# Patient Record
Sex: MALE | Race: WHITE | NOT HISPANIC OR LATINO | Employment: FULL TIME | URBAN - METROPOLITAN AREA
[De-identification: names, ages, dates, MRNs, and addresses within clinical notes are randomized per-mention and may not be internally consistent; named-entity substitution may affect disease eponyms.]

---

## 2023-06-23 ENCOUNTER — OFFICE VISIT (OUTPATIENT)
Dept: PRIMARY CARE | Facility: CLINIC | Age: 63
End: 2023-06-23
Payer: COMMERCIAL

## 2023-06-23 ENCOUNTER — LAB (OUTPATIENT)
Dept: LAB | Facility: LAB | Age: 63
End: 2023-06-23
Payer: COMMERCIAL

## 2023-06-23 VITALS
HEIGHT: 70 IN | WEIGHT: 257.6 LBS | BODY MASS INDEX: 36.88 KG/M2 | DIASTOLIC BLOOD PRESSURE: 80 MMHG | SYSTOLIC BLOOD PRESSURE: 142 MMHG

## 2023-06-23 DIAGNOSIS — M54.2 NECK PAIN: ICD-10-CM

## 2023-06-23 DIAGNOSIS — Z12.5 ENCOUNTER FOR PROSTATE CANCER SCREENING: ICD-10-CM

## 2023-06-23 DIAGNOSIS — I10 PRIMARY HYPERTENSION: ICD-10-CM

## 2023-06-23 DIAGNOSIS — R19.8 CHANGE IN BOWEL MOVEMENT: ICD-10-CM

## 2023-06-23 DIAGNOSIS — E03.9 HYPOTHYROIDISM, UNSPECIFIED TYPE: ICD-10-CM

## 2023-06-23 DIAGNOSIS — R53.83 OTHER FATIGUE: ICD-10-CM

## 2023-06-23 DIAGNOSIS — E78.5 HYPERLIPIDEMIA, UNSPECIFIED HYPERLIPIDEMIA TYPE: ICD-10-CM

## 2023-06-23 DIAGNOSIS — E55.9 VITAMIN D DEFICIENCY: ICD-10-CM

## 2023-06-23 PROBLEM — R11.0 NAUSEA IN ADULT: Status: ACTIVE | Noted: 2023-06-23

## 2023-06-23 PROBLEM — M62.838 MUSCLE SPASM: Status: ACTIVE | Noted: 2023-06-23

## 2023-06-23 PROBLEM — L08.9 INFECTED FINGER: Status: ACTIVE | Noted: 2023-06-23

## 2023-06-23 PROBLEM — M54.9 BACKACHE: Status: ACTIVE | Noted: 2023-06-23

## 2023-06-23 PROBLEM — F41.9 ANXIETY: Status: ACTIVE | Noted: 2023-06-23

## 2023-06-23 PROBLEM — M25.559 HIP PAIN: Status: ACTIVE | Noted: 2023-06-23

## 2023-06-23 PROBLEM — M25.561 RIGHT KNEE PAIN: Status: ACTIVE | Noted: 2023-06-23

## 2023-06-23 PROBLEM — R07.9 CHEST PAIN: Status: ACTIVE | Noted: 2023-06-23

## 2023-06-23 PROBLEM — R73.03 PRE-DIABETES: Status: ACTIVE | Noted: 2023-06-23

## 2023-06-23 PROBLEM — M25.562 LEFT KNEE PAIN: Status: ACTIVE | Noted: 2023-06-23

## 2023-06-23 PROBLEM — M79.606 LEG PAIN: Status: ACTIVE | Noted: 2023-06-23

## 2023-06-23 PROBLEM — E78.00 LOW-DENSITY-LIPOID-TYPE (LDL) HYPERLIPOPROTEINEMIA: Status: ACTIVE | Noted: 2023-06-23

## 2023-06-23 PROBLEM — J01.90 ACUTE SINUSITIS: Status: ACTIVE | Noted: 2023-06-23

## 2023-06-23 PROBLEM — M25.562 KNEE PAIN, BILATERAL: Status: ACTIVE | Noted: 2023-06-23

## 2023-06-23 PROBLEM — M17.11 ARTHRITIS OF KNEE, RIGHT: Status: ACTIVE | Noted: 2023-06-23

## 2023-06-23 PROBLEM — R21 RASH: Status: ACTIVE | Noted: 2023-06-23

## 2023-06-23 PROBLEM — Z86.79 HISTORY OF HEART DISEASE: Status: ACTIVE | Noted: 2023-06-23

## 2023-06-23 PROBLEM — M54.50 LOWER BACK PAIN: Status: ACTIVE | Noted: 2023-06-23

## 2023-06-23 PROBLEM — M25.561 KNEE PAIN, BILATERAL: Status: ACTIVE | Noted: 2023-06-23

## 2023-06-23 LAB
ALANINE AMINOTRANSFERASE (SGPT) (U/L) IN SER/PLAS: 56 U/L (ref 10–52)
ALBUMIN (G/DL) IN SER/PLAS: 4.4 G/DL (ref 3.4–5)
ALKALINE PHOSPHATASE (U/L) IN SER/PLAS: 49 U/L (ref 33–136)
ANION GAP IN SER/PLAS: 14 MMOL/L (ref 10–20)
APPEARANCE, URINE: CLEAR
ASPARTATE AMINOTRANSFERASE (SGOT) (U/L) IN SER/PLAS: 35 U/L (ref 9–39)
BILIRUBIN TOTAL (MG/DL) IN SER/PLAS: 0.5 MG/DL (ref 0–1.2)
BILIRUBIN, URINE: NEGATIVE
BLOOD, URINE: NEGATIVE
CALCIDIOL (25 OH VITAMIN D3) (NG/ML) IN SER/PLAS: 37 NG/ML
CALCIUM (MG/DL) IN SER/PLAS: 9.6 MG/DL (ref 8.6–10.6)
CARBON DIOXIDE, TOTAL (MMOL/L) IN SER/PLAS: 29 MMOL/L (ref 21–32)
CHLORIDE (MMOL/L) IN SER/PLAS: 101 MMOL/L (ref 98–107)
CHOLESTEROL (MG/DL) IN SER/PLAS: 256 MG/DL (ref 0–199)
CHOLESTEROL IN HDL (MG/DL) IN SER/PLAS: 44.2 MG/DL
CHOLESTEROL/HDL RATIO: 5.8
COBALAMIN (VITAMIN B12) (PG/ML) IN SER/PLAS: 587 PG/ML (ref 211–911)
COLOR, URINE: COLORLESS
CREATININE (MG/DL) IN SER/PLAS: 0.9 MG/DL (ref 0.5–1.3)
ERYTHROCYTE DISTRIBUTION WIDTH (RATIO) BY AUTOMATED COUNT: 11.6 % (ref 11.5–14.5)
ERYTHROCYTE MEAN CORPUSCULAR HEMOGLOBIN CONCENTRATION (G/DL) BY AUTOMATED: 34.6 G/DL (ref 32–36)
ERYTHROCYTE MEAN CORPUSCULAR VOLUME (FL) BY AUTOMATED COUNT: 95 FL (ref 80–100)
ERYTHROCYTES (10*6/UL) IN BLOOD BY AUTOMATED COUNT: 4.76 X10E12/L (ref 4.5–5.9)
GFR MALE: >90 ML/MIN/1.73M2
GLUCOSE (MG/DL) IN SER/PLAS: 99 MG/DL (ref 74–99)
GLUCOSE, URINE: NEGATIVE MG/DL
HEMATOCRIT (%) IN BLOOD BY AUTOMATED COUNT: 45.1 % (ref 41–52)
HEMOGLOBIN (G/DL) IN BLOOD: 15.6 G/DL (ref 13.5–17.5)
KETONES, URINE: NEGATIVE MG/DL
LDL: 162 MG/DL (ref 0–99)
LEUKOCYTE ESTERASE, URINE: NEGATIVE
LEUKOCYTES (10*3/UL) IN BLOOD BY AUTOMATED COUNT: 7 X10E9/L (ref 4.4–11.3)
NITRITE, URINE: NEGATIVE
NON HDL CHOLESTEROL: 212 MG/DL
NRBC (PER 100 WBCS) BY AUTOMATED COUNT: 0 /100 WBC (ref 0–0)
PH, URINE: 7 (ref 5–8)
PLATELETS (10*3/UL) IN BLOOD AUTOMATED COUNT: 286 X10E9/L (ref 150–450)
POTASSIUM (MMOL/L) IN SER/PLAS: 4.4 MMOL/L (ref 3.5–5.3)
PROSTATE SPECIFIC ANTIGEN,SCREEN: 0.99 NG/ML (ref 0–4)
PROTEIN TOTAL: 7.2 G/DL (ref 6.4–8.2)
PROTEIN, URINE: NEGATIVE MG/DL
SODIUM (MMOL/L) IN SER/PLAS: 140 MMOL/L (ref 136–145)
SPECIFIC GRAVITY, URINE: 1 (ref 1–1.03)
THYROTROPIN (MIU/L) IN SER/PLAS BY DETECTION LIMIT <= 0.05 MIU/L: 3.02 MIU/L (ref 0.44–3.98)
TRIGLYCERIDE (MG/DL) IN SER/PLAS: 251 MG/DL (ref 0–149)
UREA NITROGEN (MG/DL) IN SER/PLAS: 13 MG/DL (ref 6–23)
UROBILINOGEN, URINE: <2 MG/DL (ref 0–1.9)
VLDL: 50 MG/DL (ref 0–40)

## 2023-06-23 PROCEDURE — 80053 COMPREHEN METABOLIC PANEL: CPT

## 2023-06-23 PROCEDURE — 84153 ASSAY OF PSA TOTAL: CPT

## 2023-06-23 PROCEDURE — 3079F DIAST BP 80-89 MM HG: CPT | Performed by: INTERNAL MEDICINE

## 2023-06-23 PROCEDURE — 85027 COMPLETE CBC AUTOMATED: CPT

## 2023-06-23 PROCEDURE — 36415 COLL VENOUS BLD VENIPUNCTURE: CPT

## 2023-06-23 PROCEDURE — 80061 LIPID PANEL: CPT

## 2023-06-23 PROCEDURE — 82607 VITAMIN B-12: CPT

## 2023-06-23 PROCEDURE — 99214 OFFICE O/P EST MOD 30 MIN: CPT | Performed by: INTERNAL MEDICINE

## 2023-06-23 PROCEDURE — 84443 ASSAY THYROID STIM HORMONE: CPT

## 2023-06-23 PROCEDURE — 3077F SYST BP >= 140 MM HG: CPT | Performed by: INTERNAL MEDICINE

## 2023-06-23 PROCEDURE — 81003 URINALYSIS AUTO W/O SCOPE: CPT

## 2023-06-23 PROCEDURE — 87506 IADNA-DNA/RNA PROBE TQ 6-11: CPT

## 2023-06-23 PROCEDURE — 82306 VITAMIN D 25 HYDROXY: CPT

## 2023-06-23 PROCEDURE — 1036F TOBACCO NON-USER: CPT | Performed by: INTERNAL MEDICINE

## 2023-06-23 RX ORDER — TRAMADOL HYDROCHLORIDE 50 MG/1
TABLET ORAL
COMMUNITY
Start: 2022-11-11

## 2023-06-23 NOTE — PROGRESS NOTES
OFFICE NOTE    NAME OF THE PATIENT: Kevin Olea    YOB: 1960    CHIEF COMPLAINT:  This gentleman today came here for follow-up appointment on various conditions.  Extreme neck pain going into arm, tingling and numbness, weakness in the right arm more than left.  He had cervical spondylosis in the past.  It is getting worse.  He is dropping things, tingling and numbness, not feeling well.  He wakes up at night with tingling.  Excruciating back pain going into right leg, difficulty in walking.  His lower back pain getting worse.  Change in bowel habit.  ______ coming out.  Abdominal pain.  He has a family history of colon cancer.  Colonoscopy was sometime ago.  Feeling tired, fatigued, exhausted, weight gain, feeling very weak.  He came for follow-up on various conditions.  Water retention.    PAST MEDICAL HISTORY:  Reviewed on EMR, unchanged.    CURRENT MEDICATIONS:  Reviewed on EMR, unchanged.  Losartan, Prilosec, baby aspirin.    ALLERGIES:  Reviewed on EMR, unchanged.    SOCIAL HISTORY:  Reviewed on EMR, unchanged.  He does not smoke.  Alcohol social.  No history of drug abuse.    FAMILY HISTORY:  Reviewed on EMR, unchanged.    REVIEW OF SYSTEMS:  All 12 systems reviewed and pertaining covered in history and physical.    PHYSICAL EXAMINATION  VITAL SIGNS:  As recorded and reviewed from EMR.  NECK:  Diffuse tenderness.  Movements painful.  Pain along the arm.  RESPIRATORY:  The patient had normal inspirations and expirations.  The breath sounds were equal bilaterally and clear to auscultation.  CARDIOVASCULAR:  The patient had S1 normal, split S2 without obvious rubs, clicks, or murmurs.    GASTROINTESTINAL:  There was no hepatosplenomegaly.  There were no palpable masses and no inguinal nodes.  Abdomen, diffuse tenderness.  EXTREMITIES:  Legs had no edema.  NEUROLOGIC:  The patient had normal cranial nerves.  The reflexes, sensory, and motor examination were grossly within normal  "limits.  BACK:  Diffuse tenderness.  Movements painful.  Straight leg limited.    LAB WORK:  Laboratory testing discussed.    ASSESSMENT AND PLAN:  Change in bowel habit, abdominal cramping, distention.  CT scan ordered.  Change in type of the stool and change in bowel habit.  Colonoscopy is a must.  Immediate set up.  Cervical spondylosis, pain.  This combination could be carpal tunnel versus cervical.  I reviewed his MRI from 2013.  At that time he had lot of osteophyte formation.  I am worried it might have got worse.  We shall do an MRI of the cervical spine.  I do not think x-ray or physical therapy will help because situation is pretty advanced.  Low back pain with radiculopathy, right-sided.  He will need an MRI of the lumbar spine.  Hypertension, okay.  GERD, on PPI.  Complete blood work ordered.  I shall see him in a week after testing.    Kindly review this note in conjunction with EMR.   Subjective   Patient ID: Kevin Olea is a 63 y.o. male who presents for Follow-up (Change in stool).      HPI    Review of Systems    Objective   /80   Ht 1.778 m (5' 10\")   Wt 117 kg (257 lb 9.6 oz)   BMI 36.96 kg/m²       Physical Exam    Assessment/Plan   Problem List Items Addressed This Visit    None        "

## 2023-06-24 LAB
CAMPYLOBACTER GP: NOT DETECTED
NOROVIRUS GI/GII: NOT DETECTED
ROTAVIRUS A: NOT DETECTED
SALMONELLA SP.: NOT DETECTED
SHIGA TOXIN 1: NOT DETECTED
SHIGA TOXIN 2: NOT DETECTED
SHIGELLA SP.: NOT DETECTED
VIBRIO GRP.: NOT DETECTED
YERSINIA ENTEROCOLITICA: NOT DETECTED

## 2023-07-11 DIAGNOSIS — K21.9 GASTROESOPHAGEAL REFLUX DISEASE, UNSPECIFIED WHETHER ESOPHAGITIS PRESENT: ICD-10-CM

## 2023-07-11 RX ORDER — OMEPRAZOLE 40 MG/1
CAPSULE, DELAYED RELEASE ORAL
COMMUNITY
End: 2023-07-11 | Stop reason: SDUPTHER

## 2023-07-11 RX ORDER — PRAVASTATIN SODIUM 80 MG/1
80 TABLET ORAL DAILY
Qty: 90 TABLET | Refills: 1 | Status: SHIPPED | OUTPATIENT
Start: 2023-07-11

## 2023-07-11 RX ORDER — OMEPRAZOLE 40 MG/1
CAPSULE, DELAYED RELEASE ORAL
Qty: 90 CAPSULE | Refills: 1 | Status: SHIPPED | OUTPATIENT
Start: 2023-07-11 | End: 2024-01-16 | Stop reason: SDUPTHER

## 2023-07-11 RX ORDER — PRAVASTATIN SODIUM 80 MG/1
1 TABLET ORAL DAILY
COMMUNITY
Start: 2013-03-12 | End: 2023-07-11 | Stop reason: SDUPTHER

## 2023-07-12 ENCOUNTER — TELEPHONE (OUTPATIENT)
Dept: PRIMARY CARE | Facility: CLINIC | Age: 63
End: 2023-07-12
Payer: COMMERCIAL

## 2023-07-12 NOTE — TELEPHONE ENCOUNTER
----- Message from Jose Antonio Capellan MD sent at 7/12/2023 12:19 AM EDT -----  Make an appt 5 - 7 days

## 2023-07-17 DIAGNOSIS — K21.9 GASTROESOPHAGEAL REFLUX DISEASE WITHOUT ESOPHAGITIS: ICD-10-CM

## 2023-07-18 ENCOUNTER — OFFICE VISIT (OUTPATIENT)
Dept: PRIMARY CARE | Facility: CLINIC | Age: 63
End: 2023-07-18
Payer: COMMERCIAL

## 2023-07-18 VITALS
HEIGHT: 70 IN | SYSTOLIC BLOOD PRESSURE: 136 MMHG | BODY MASS INDEX: 36.73 KG/M2 | WEIGHT: 256.6 LBS | DIASTOLIC BLOOD PRESSURE: 72 MMHG

## 2023-07-18 DIAGNOSIS — R97.20 ELEVATED PSA: ICD-10-CM

## 2023-07-18 DIAGNOSIS — N32.89 BLADDER MASS: ICD-10-CM

## 2023-07-18 DIAGNOSIS — M47.812 CERVICAL SPONDYLOSIS: ICD-10-CM

## 2023-07-18 PROCEDURE — 3075F SYST BP GE 130 - 139MM HG: CPT | Performed by: INTERNAL MEDICINE

## 2023-07-18 PROCEDURE — 99214 OFFICE O/P EST MOD 30 MIN: CPT | Performed by: INTERNAL MEDICINE

## 2023-07-18 PROCEDURE — 3078F DIAST BP <80 MM HG: CPT | Performed by: INTERNAL MEDICINE

## 2023-07-18 PROCEDURE — 1036F TOBACCO NON-USER: CPT | Performed by: INTERNAL MEDICINE

## 2023-07-18 RX ORDER — TAMSULOSIN HYDROCHLORIDE 0.4 MG/1
0.4 CAPSULE ORAL DAILY
Qty: 30 CAPSULE | Refills: 3 | Status: SHIPPED | OUTPATIENT
Start: 2023-07-18 | End: 2023-08-28 | Stop reason: SDUPTHER

## 2023-07-18 NOTE — PROGRESS NOTES
OFFICE NOTE    NAME OF THE PATIENT: Kevin Olea    YOB: 1960    CHIEF COMPLAINT:  This gentleman today came here for multiple medical issues.  He told me that stool he thinks is coming out like spaghetti.  It is contacted.  He had a Luna's esophagus.  He is due for upper endoscopy, he has not done.  He wants to combine with colonoscopy.  He is waking up many times at night for pee-pee.  He came for follow-up on various conditions.    PAST MEDICAL HISTORY:  Reviewed on EMR, unchanged.    CURRENT MEDICATIONS:  Reviewed on EMR, unchanged.    ALLERGIES:  Reviewed on EMR, unchanged.    SOCIAL HISTORY:  Reviewed on EMR, unchanged.    FAMILY HISTORY:  Reviewed on EMR, unchanged.    REVIEW OF SYSTEMS:  All 12 systems reviewed and pertaining covered in history and physical.    PHYSICAL EXAMINATION  VITAL SIGNS:  As recorded and reviewed from EMR.  RESPIRATORY:  The patient had normal inspirations and expirations.  The breath sounds were equal bilaterally and clear to auscultation.  CARDIOVASCULAR:  The patient had S1 normal, split S2 without obvious rubs, clicks, or murmurs.    GASTROINTESTINAL:  There was no hepatosplenomegaly.  There were no palpable masses and no inguinal nodes.  EXTREMITIES:  Legs had no edema.  NEUROLOGIC:  The patient had normal cranial nerves.  The reflexes, sensory, and motor examination were grossly within normal limits.  RECTAL:  Prostate enlarged.  Hemoccult negative.    LAB WORK:  Laboratory testing discussed.    ASSESSMENT AND PLAN:  BPH.  I will start Flomax.  Change in bowel habit.  Colonoscopy.  GERD and Luna's esophagus.  He will surveillance upper endoscopy.  It will be nice if we can combine both.  Cervical spondylosis with pinched nerve getting worse.  I am going to refer him to Dr. Gee.  He has already seen Pain Management and he already tried nerve block in the past.  They did not work.  Hypertension, okay.  Cholesterol, stable.  I shall see him back in  four weeks.   weight loss.  Referred to GI if necessary.  Urinary bladder wall thickening.  He does not have urologist.  He needs cystoscopy.  I am going to refer him to urologist to get checked out.    Kindly review this note in conjunction with EMR.   Subjective   Patient ID: Kevin Olea is a 63 y.o. male who presents for No chief complaint on file..      HPI    Review of Systems    Objective   There were no vitals taken for this visit.      Physical Exam    Assessment/Plan   Problem List Items Addressed This Visit    None

## 2023-08-03 ENCOUNTER — HOSPITAL ENCOUNTER (OUTPATIENT)
Dept: DATA CONVERSION | Facility: HOSPITAL | Age: 63
End: 2023-08-03
Attending: SURGERY | Admitting: SURGERY
Payer: COMMERCIAL

## 2023-08-03 DIAGNOSIS — D12.4 BENIGN NEOPLASM OF DESCENDING COLON: ICD-10-CM

## 2023-08-03 DIAGNOSIS — R19.4 CHANGE IN BOWEL HABIT: ICD-10-CM

## 2023-08-03 DIAGNOSIS — D12.8 BENIGN NEOPLASM OF RECTUM: ICD-10-CM

## 2023-08-03 DIAGNOSIS — R19.8 OTHER SPECIFIED SYMPTOMS AND SIGNS INVOLVING THE DIGESTIVE SYSTEM AND ABDOMEN: ICD-10-CM

## 2023-08-08 LAB
COMPLETE PATHOLOGY REPORT: NORMAL
CONVERTED CLINICAL DIAGNOSIS-HISTORY: NORMAL
CONVERTED FINAL DIAGNOSIS: NORMAL
CONVERTED FINAL REPORT PDF LINK TO COPY AND PASTE: NORMAL
CONVERTED GROSS DESCRIPTION: NORMAL

## 2023-08-28 ENCOUNTER — OFFICE VISIT (OUTPATIENT)
Dept: PRIMARY CARE | Facility: CLINIC | Age: 63
End: 2023-08-28
Payer: COMMERCIAL

## 2023-08-28 VITALS
WEIGHT: 264 LBS | SYSTOLIC BLOOD PRESSURE: 144 MMHG | BODY MASS INDEX: 37.8 KG/M2 | HEIGHT: 70 IN | DIASTOLIC BLOOD PRESSURE: 86 MMHG

## 2023-08-28 DIAGNOSIS — J01.90 ACUTE SINUSITIS, RECURRENCE NOT SPECIFIED, UNSPECIFIED LOCATION: ICD-10-CM

## 2023-08-28 DIAGNOSIS — R97.20 ELEVATED PSA: ICD-10-CM

## 2023-08-28 DIAGNOSIS — K21.9 GASTROESOPHAGEAL REFLUX DISEASE, UNSPECIFIED WHETHER ESOPHAGITIS PRESENT: ICD-10-CM

## 2023-08-28 PROCEDURE — 3079F DIAST BP 80-89 MM HG: CPT | Performed by: INTERNAL MEDICINE

## 2023-08-28 PROCEDURE — 3077F SYST BP >= 140 MM HG: CPT | Performed by: INTERNAL MEDICINE

## 2023-08-28 PROCEDURE — 1036F TOBACCO NON-USER: CPT | Performed by: INTERNAL MEDICINE

## 2023-08-28 PROCEDURE — 99214 OFFICE O/P EST MOD 30 MIN: CPT | Performed by: INTERNAL MEDICINE

## 2023-08-28 RX ORDER — LEVOFLOXACIN 500 MG/1
500 TABLET, FILM COATED ORAL DAILY
Qty: 10 TABLET | Refills: 0 | Status: SHIPPED | OUTPATIENT
Start: 2023-08-28 | End: 2023-09-07

## 2023-08-28 RX ORDER — TAMSULOSIN HYDROCHLORIDE 0.4 MG/1
0.4 CAPSULE ORAL DAILY
Qty: 90 CAPSULE | Refills: 1 | Status: SHIPPED | OUTPATIENT
Start: 2023-08-28

## 2023-08-28 RX ORDER — BUDESONIDE, GLYCOPYRROLATE, AND FORMOTEROL FUMARATE 160; 9; 4.8 UG/1; UG/1; UG/1
2 AEROSOL, METERED RESPIRATORY (INHALATION) 2 TIMES DAILY
Qty: 10.7 G | Refills: 5 | Status: SHIPPED | OUTPATIENT
Start: 2023-08-28 | End: 2023-11-17 | Stop reason: ALTCHOICE

## 2023-08-28 RX ORDER — PREDNISONE 10 MG/1
10 TABLET ORAL DAILY
Qty: 21 TABLET | Refills: 0 | Status: SHIPPED | OUTPATIENT
Start: 2023-08-28 | End: 2023-10-28 | Stop reason: ALTCHOICE

## 2023-08-28 RX ORDER — LORATADINE 10 MG/1
10 TABLET ORAL DAILY
Qty: 30 TABLET | Refills: 2 | Status: SHIPPED | OUTPATIENT
Start: 2023-08-28 | End: 2023-10-28 | Stop reason: ALTCHOICE

## 2023-08-28 ASSESSMENT — ENCOUNTER SYMPTOMS
DEPRESSION: 0
OCCASIONAL FEELINGS OF UNSTEADINESS: 0
LOSS OF SENSATION IN FEET: 0

## 2023-08-28 NOTE — PROGRESS NOTES
OFFICE NOTE    NAME OF THE PATIENT: Kevin Olea    YOB: 1960    CHIEF COMPLAINT:  This gentleman today came here for cough, yellow sputum, sinus congestion, bronchitis, headache, congestion going on for the last several days.  Over-the-counter medications not helping.  He came here for follow-up on various conditions.    PAST MEDICAL HISTORY:  Reviewed on EMR, unchanged.    CURRENT MEDICATIONS:  Reviewed on EMR, unchanged.  List reviewed.    ALLERGIES:  Reviewed on EMR, unchanged.    SOCIAL HISTORY:  Reviewed on EMR, unchanged.  He does not smoke and does not drink alcohol.    FAMILY HISTORY:  Reviewed on EMR, unchanged.    REVIEW OF SYSTEMS:  All 12 systems reviewed and pertaining covered in history and physical.    PHYSICAL EXAMINATION  VITAL SIGNS:  As recorded and reviewed from EMR.  EYES:  The patient's sclerae white.  The pupils were equal and round.  ENT:  Nose and throat congested.  Postnasal drip.  RESPIRATORY:  Bilateral wheezing present.  CARDIOVASCULAR:  The patient had S1 normal, split S2 without obvious rubs, clicks, or murmurs.    GASTROINTESTINAL:  There was no hepatosplenomegaly.  There were no palpable masses and no inguinal nodes.  EXTREMITIES:  Legs had 1+ edema.  NEUROLOGIC:  The patient had normal cranial nerves.  The reflexes, sensory, and motor examination were grossly within normal limits.    LAB WORK:  Laboratory testing discussed.    ASSESSMENT AND PLAN:  Acute asthmatic bronchitis and cough.  Levaquin, Claritin, Robitussin, Flonase, Symbicort, albuterol, and tapering dose of prednisone given.  Status post colonoscopy, he has a benign colon polyp.  We will keep an eye.  Hypertension, okay.  High cholesterol, stable.  I shall see him in two weeks if he is not better.  Otherwise, routine check.  Continue to follow.      Kindly review this note in conjunction with EMR.     Subjective   Patient ID: Kevin Olea is a 63 y.o. male who presents for Sick Visit (Chest  "cold).      HPI    Review of Systems    Objective   /86   Ht 1.778 m (5' 10\")   Wt 120 kg (264 lb)   BMI 37.88 kg/m²       Physical Exam    Assessment/Plan   Problem List Items Addressed This Visit       Gastroesophageal reflux disease     Other Visit Diagnoses       Elevated PSA                  "

## 2023-09-29 VITALS — HEIGHT: 70 IN | BODY MASS INDEX: 35.66 KG/M2 | WEIGHT: 249.12 LBS

## 2023-09-30 NOTE — H&P
History of Present Illness:   History Present Illness:  Reason for surgery: Colonoscopy   HPI:    Colonoscopy    Allergies:        Allergies:  ·  No Known Allergies :     Home Medication Review:   Home Medications Reviewed: yes     Impression/Procedure:   ·  Impression and Planned Procedure: Colonoscopy       ERAS (Enhanced Recovery After Surgery):  ·  ERAS Patient: no       Physical Exam by System:    Constitutional: Awake, alert. No acute distress.   Eyes: EOMI.   ENMT: MMM.   Head/Neck: Supple.   Respiratory/Thorax: No increased work of breathing.   Cardiovascular: Regular rate.   Gastrointestinal: Nondistended.   Musculoskeletal: SORTO x 4.   Neurological: No deficits.   Psychological: Normal affect.   Skin: No rashes.     Consent:   COVID-19 Consent:  ·  COVID-19 Risk Consent Surgeon has reviewed key risks related to the risk of adina COVID-19 and if they contract COVID-19 what the risks are.     Attestation:   Note Completion:  I am a:  Advanced Practice Provider   Attending Only - Shared Visit with Advanced Practice Provider This is a shared visit.  I have reviewed the Advanced Practice Provider?s encounter note, approve the Advanced Practice Provider?s documentation,  and provide the following additional information from my personal encounter.    Comments/ Additional Findings    I have seen the patient with the team and agree with their assessment and plan. I further agree with the note as written above, as I have made any  corrections personally within the body of the note.    Joao Riggs MD  General Surgery          Electronic Signatures:  Adam Riggs)  (Signed 03-Aug-2023 12:03)   Authored: Note Completion   Co-Signer: History of Present Illness, Allergies, Home Medication Review, Impression/Procedure, ERAS, Physical Exam, Consent, Note Completion  Letty Ortiz (PHYSICIAN))  (Signed 03-Aug-2023 11:38)   Authored: History of Present Illness, Allergies, Home  Medication Review,  Impression/Procedure, ERAS, Physical Exam, Consent, Note Completion      Last Updated: 03-Aug-2023 12:03 by Adam Riggs)

## 2023-10-28 ENCOUNTER — OFFICE VISIT (OUTPATIENT)
Dept: PRIMARY CARE | Facility: CLINIC | Age: 63
End: 2023-10-28
Payer: COMMERCIAL

## 2023-10-28 ENCOUNTER — LAB (OUTPATIENT)
Dept: LAB | Facility: LAB | Age: 63
End: 2023-10-28
Payer: COMMERCIAL

## 2023-10-28 VITALS
HEIGHT: 70 IN | DIASTOLIC BLOOD PRESSURE: 90 MMHG | WEIGHT: 259 LBS | SYSTOLIC BLOOD PRESSURE: 150 MMHG | BODY MASS INDEX: 37.08 KG/M2

## 2023-10-28 DIAGNOSIS — E78.00 LOW-DENSITY-LIPOID-TYPE (LDL) HYPERLIPOPROTEINEMIA: ICD-10-CM

## 2023-10-28 DIAGNOSIS — E03.8 OTHER SPECIFIED HYPOTHYROIDISM: ICD-10-CM

## 2023-10-28 DIAGNOSIS — E55.9 VITAMIN D DEFICIENCY: ICD-10-CM

## 2023-10-28 DIAGNOSIS — M25.561 ACUTE PAIN OF BOTH KNEES: ICD-10-CM

## 2023-10-28 DIAGNOSIS — I10 PRIMARY HYPERTENSION: ICD-10-CM

## 2023-10-28 DIAGNOSIS — E78.49 OTHER HYPERLIPIDEMIA: ICD-10-CM

## 2023-10-28 DIAGNOSIS — M54.50 ACUTE BILATERAL LOW BACK PAIN WITHOUT SCIATICA: ICD-10-CM

## 2023-10-28 DIAGNOSIS — F31.9 BIPOLAR AFFECTIVE DISORDER, REMISSION STATUS UNSPECIFIED (MULTI): ICD-10-CM

## 2023-10-28 DIAGNOSIS — M25.562 ACUTE PAIN OF BOTH KNEES: ICD-10-CM

## 2023-10-28 DIAGNOSIS — R29.898 LEFT LEG WEAKNESS: ICD-10-CM

## 2023-10-28 DIAGNOSIS — R73.03 PREDIABETES: ICD-10-CM

## 2023-10-28 DIAGNOSIS — M54.10 BACK PAIN WITH RADICULOPATHY: Primary | ICD-10-CM

## 2023-10-28 LAB
ALBUMIN SERPL BCP-MCNC: 4.5 G/DL (ref 3.4–5)
ALP SERPL-CCNC: 45 U/L (ref 33–136)
ALT SERPL W P-5'-P-CCNC: 45 U/L (ref 10–52)
ANION GAP SERPL CALC-SCNC: 10 MMOL/L (ref 10–20)
AST SERPL W P-5'-P-CCNC: 33 U/L (ref 9–39)
BILIRUB SERPL-MCNC: 0.4 MG/DL (ref 0–1.2)
BUN SERPL-MCNC: 12 MG/DL (ref 6–23)
CALCIUM SERPL-MCNC: 9.5 MG/DL (ref 8.6–10.6)
CHLORIDE SERPL-SCNC: 105 MMOL/L (ref 98–107)
CHOLEST SERPL-MCNC: 207 MG/DL (ref 0–199)
CHOLESTEROL/HDL RATIO: 4.7
CO2 SERPL-SCNC: 29 MMOL/L (ref 21–32)
CREAT SERPL-MCNC: 0.99 MG/DL (ref 0.5–1.3)
ERYTHROCYTE [DISTWIDTH] IN BLOOD BY AUTOMATED COUNT: 11.6 % (ref 11.5–14.5)
GFR SERPL CREATININE-BSD FRML MDRD: 87 ML/MIN/1.73M*2
GLUCOSE SERPL-MCNC: 103 MG/DL (ref 74–99)
HCT VFR BLD AUTO: 43.6 % (ref 41–52)
HDLC SERPL-MCNC: 44.5 MG/DL
HGB BLD-MCNC: 14.6 G/DL (ref 13.5–17.5)
LDLC SERPL CALC-MCNC: 134 MG/DL
MCH RBC QN AUTO: 31.6 PG (ref 26–34)
MCHC RBC AUTO-ENTMCNC: 33.5 G/DL (ref 32–36)
MCV RBC AUTO: 94 FL (ref 80–100)
NON HDL CHOLESTEROL: 163 MG/DL (ref 0–149)
NRBC BLD-RTO: 0 /100 WBCS (ref 0–0)
PLATELET # BLD AUTO: 245 X10*3/UL (ref 150–450)
PMV BLD AUTO: 10.4 FL (ref 7.5–11.5)
POTASSIUM SERPL-SCNC: 4.2 MMOL/L (ref 3.5–5.3)
PROT SERPL-MCNC: 6.9 G/DL (ref 6.4–8.2)
RBC # BLD AUTO: 4.62 X10*6/UL (ref 4.5–5.9)
SODIUM SERPL-SCNC: 140 MMOL/L (ref 136–145)
TRIGL SERPL-MCNC: 141 MG/DL (ref 0–149)
TSH SERPL-ACNC: 1.86 MIU/L (ref 0.44–3.98)
VLDL: 28 MG/DL (ref 0–40)
WBC # BLD AUTO: 5.7 X10*3/UL (ref 4.4–11.3)

## 2023-10-28 PROCEDURE — 3080F DIAST BP >= 90 MM HG: CPT | Performed by: INTERNAL MEDICINE

## 2023-10-28 PROCEDURE — 36415 COLL VENOUS BLD VENIPUNCTURE: CPT

## 2023-10-28 PROCEDURE — 84443 ASSAY THYROID STIM HORMONE: CPT

## 2023-10-28 PROCEDURE — 80061 LIPID PANEL: CPT

## 2023-10-28 PROCEDURE — 1036F TOBACCO NON-USER: CPT | Performed by: INTERNAL MEDICINE

## 2023-10-28 PROCEDURE — 99214 OFFICE O/P EST MOD 30 MIN: CPT | Performed by: INTERNAL MEDICINE

## 2023-10-28 PROCEDURE — 83036 HEMOGLOBIN GLYCOSYLATED A1C: CPT

## 2023-10-28 PROCEDURE — 80053 COMPREHEN METABOLIC PANEL: CPT

## 2023-10-28 PROCEDURE — 85027 COMPLETE CBC AUTOMATED: CPT

## 2023-10-28 PROCEDURE — 3077F SYST BP >= 140 MM HG: CPT | Performed by: INTERNAL MEDICINE

## 2023-10-28 RX ORDER — LOSARTAN POTASSIUM 100 MG/1
100 TABLET ORAL DAILY
COMMUNITY
End: 2023-10-28 | Stop reason: SDUPTHER

## 2023-10-28 RX ORDER — LOSARTAN POTASSIUM 100 MG/1
100 TABLET ORAL DAILY
Qty: 90 TABLET | Refills: 1 | Status: SHIPPED | OUTPATIENT
Start: 2023-10-28 | End: 2023-11-17 | Stop reason: ALTCHOICE

## 2023-10-29 NOTE — PROGRESS NOTES
"Subjective   Patient ID: Kevin Olea is a 63 y.o. male who presents for Follow-up (multiple medical issues).    Kevin today came here for multiple medical issues.  He is very upset.  He is trying to lose weight.  Because of his bad knee he cannot exercise.  He wants some medication.  He is pre-diabetic.  His father was diabetic.  He is concerned.  He becomes diabetic.  He came here for follow-up on various conditions.  Severe back pain going into the legs.  He might need surgery.  He is waiting to see a neurosurgeon.    I have personally reviewed the patient's Past Medical History, Medications, Allergies, Social History, and Family History in the EMR.    Review of Systems   All other systems reviewed and are negative.    Objective   /90   Ht 1.778 m (5' 10\")   Wt 117 kg (259 lb)   BMI 37.16 kg/m²     Physical Exam  Vitals reviewed.   Cardiovascular:      Heart sounds: Normal heart sounds, S1 normal and S2 normal. No murmur heard.     No friction rub.   Pulmonary:      Effort: Pulmonary effort is normal.      Breath sounds: Normal breath sounds and air entry.   Abdominal:      Palpations: There is no hepatomegaly, splenomegaly or mass.   Musculoskeletal:      Right lower leg: No edema.      Left lower leg: No edema.      Comments: BACK:  Diffuse tenderness.  Movements painful.   Lymphadenopathy:      Lower Body: No right inguinal adenopathy. No left inguinal adenopathy.   Neurological:      Cranial Nerves: Cranial nerves 2-12 are intact.      Sensory: No sensory deficit.      Motor: Motor function is intact.      Deep Tendon Reflexes: Reflexes are normal and symmetric.     LAB WORK:  Laboratory testing discussed.    Assessment/Plan   Problem List Items Addressed This Visit             ICD-10-CM       Cardiac and Vasculature    Hyperlipidemia E78.5    Relevant Orders    CBC (Completed)    Comprehensive Metabolic Panel (Completed)    Hemoglobin A1C    Lipid Panel (Completed)    Thyroid Stimulating " Hormone (Completed)    Hypertension I10    Relevant Medications    losartan (Cozaar) 100 mg tablet    Other Relevant Orders    CBC (Completed)    Comprehensive Metabolic Panel (Completed)    Hemoglobin A1C    Lipid Panel (Completed)    Thyroid Stimulating Hormone (Completed)    Low-density-lipoid-type (LDL) hyperlipoproteinemia E78.00    Relevant Orders    CBC (Completed)    Comprehensive Metabolic Panel (Completed)    Hemoglobin A1C    Lipid Panel (Completed)    Thyroid Stimulating Hormone (Completed)       Endocrine/Metabolic    Hypothyroidism E03.9    Relevant Orders    CBC (Completed)    Comprehensive Metabolic Panel (Completed)    Hemoglobin A1C    Lipid Panel (Completed)    Thyroid Stimulating Hormone (Completed)    Vitamin D deficiency E55.9    Relevant Orders    CBC (Completed)    Comprehensive Metabolic Panel (Completed)    Hemoglobin A1C    Lipid Panel (Completed)    Thyroid Stimulating Hormone (Completed)       Musculoskeletal and Injuries    Lower back pain M54.50    Relevant Orders    MR lumbar spine wo IV contrast    CBC (Completed)    Comprehensive Metabolic Panel (Completed)    Hemoglobin A1C    Lipid Panel (Completed)    Thyroid Stimulating Hormone (Completed)    Knee pain, bilateral M25.561, M25.562    Relevant Orders    Referral to Orthopaedic Surgery    CBC (Completed)    Comprehensive Metabolic Panel (Completed)    Hemoglobin A1C    Lipid Panel (Completed)    Thyroid Stimulating Hormone (Completed)     Other Visit Diagnoses         Codes    Left leg weakness    -  Primary R29.898    Bipolar affective disorder, remission status unspecified (CMS/[facility])     F31.9    Prediabetes     R73.03        1.  Back pain with radiculopathy, weakness in the left leg, acute-on-chronic situation.  Tried therapy.  Medications not working.  Going to surgery.  MRI ordered.  2. Pre-diabetic, diabetic, weight gain.  I ordered fasting sugar, thyroid, hemoglobin A1c.  3. Hypertension, okay.  Blood pressure is high.   Monitor.  4. Bipolar disorder, okay.  5. Sleep apnea machine is working very well.  6. Knee pain.  He wants a second opinion.  Refer to Dr. Duval.  7. Complete blood work ordered.  8. I urged him to see me in a week after test.  9. Continue to follow.    Scribe Attestation  By signing my name below, I, Felix Watts attest that this documentation has been prepared under the direction and in the presence of Jose Antonio Capellan MD.

## 2023-10-30 LAB
EST. AVERAGE GLUCOSE BLD GHB EST-MCNC: 111 MG/DL
HBA1C MFR BLD: 5.5 %

## 2023-11-17 ENCOUNTER — OFFICE VISIT (OUTPATIENT)
Dept: PRIMARY CARE | Facility: CLINIC | Age: 63
End: 2023-11-17
Payer: COMMERCIAL

## 2023-11-17 VITALS
BODY MASS INDEX: 38.22 KG/M2 | HEIGHT: 70 IN | WEIGHT: 267 LBS | SYSTOLIC BLOOD PRESSURE: 146 MMHG | DIASTOLIC BLOOD PRESSURE: 92 MMHG

## 2023-11-17 DIAGNOSIS — Z98.890 HISTORY OF CARPAL TUNNEL SURGERY: ICD-10-CM

## 2023-11-17 DIAGNOSIS — K21.9 GASTROESOPHAGEAL REFLUX DISEASE, UNSPECIFIED WHETHER ESOPHAGITIS PRESENT: ICD-10-CM

## 2023-11-17 DIAGNOSIS — E78.00 HIGH CHOLESTEROL: ICD-10-CM

## 2023-11-17 DIAGNOSIS — E66.3 OVERWEIGHT: Primary | ICD-10-CM

## 2023-11-17 DIAGNOSIS — R73.03 PRE-DIABETES: ICD-10-CM

## 2023-11-17 DIAGNOSIS — I10 PRIMARY HYPERTENSION: ICD-10-CM

## 2023-11-17 DIAGNOSIS — M54.9 BACK PAIN, UNSPECIFIED BACK LOCATION, UNSPECIFIED BACK PAIN LATERALITY, UNSPECIFIED CHRONICITY: ICD-10-CM

## 2023-11-17 PROCEDURE — 1036F TOBACCO NON-USER: CPT | Performed by: INTERNAL MEDICINE

## 2023-11-17 PROCEDURE — 99214 OFFICE O/P EST MOD 30 MIN: CPT | Performed by: INTERNAL MEDICINE

## 2023-11-17 PROCEDURE — 3077F SYST BP >= 140 MM HG: CPT | Performed by: INTERNAL MEDICINE

## 2023-11-17 PROCEDURE — 3080F DIAST BP >= 90 MM HG: CPT | Performed by: INTERNAL MEDICINE

## 2023-11-17 RX ORDER — IBUPROFEN 600 MG/1
600 TABLET ORAL EVERY 6 HOURS PRN
COMMUNITY
Start: 2023-11-13

## 2023-11-17 RX ORDER — TADALAFIL 5 MG/1
5 TABLET ORAL DAILY
COMMUNITY
Start: 2023-10-24

## 2023-11-17 RX ORDER — LOSARTAN POTASSIUM 100 MG/1
100 TABLET ORAL DAILY
Qty: 90 TABLET | Refills: 0 | Status: SHIPPED | OUTPATIENT
Start: 2023-11-17

## 2023-11-17 ASSESSMENT — ENCOUNTER SYMPTOMS
DEPRESSION: 0
LOSS OF SENSATION IN FEET: 0
OCCASIONAL FEELINGS OF UNSTEADINESS: 0

## 2023-11-17 NOTE — PROGRESS NOTES
"Subjective   Patient ID: Kevin Olea is a 63 y.o. male who presents for Follow-up (on various conditions).    Kevin today came here for follow-up on various conditions.  Overall, he is a happy person.  He had a carpal tunnel surgery.  He is in a lot of pain with the right wrist.  He wants to go for the weight loss.  His insurance is not covering [employer].  He wants to go something.  He wants to lose the weight.  He has active desire for it.  He is very driven.  Appetite and weight are okay.    I have personally reviewed the patient's Past Medical History, Medications, Allergies, Social History, and Family History in the EMR.    Review of Systems   All other systems reviewed and are negative.    Objective   BP (!) 146/92   Ht 1.778 m (5' 10\")   Wt 121 kg (267 lb)   BMI 38.31 kg/m²     Physical Exam  Vitals reviewed.   Cardiovascular:      Heart sounds: Normal heart sounds, S1 normal and S2 normal. No murmur heard.     No friction rub.   Pulmonary:      Effort: Pulmonary effort is normal.      Breath sounds: Normal breath sounds and air entry.   Abdominal:      Palpations: There is no hepatomegaly, splenomegaly or mass.   Musculoskeletal:      Right lower leg: No edema.      Left lower leg: No edema.      Comments: Right arm status post surgery.   Lymphadenopathy:      Lower Body: No right inguinal adenopathy. No left inguinal adenopathy.   Neurological:      Cranial Nerves: Cranial nerves 2-12 are intact.      Sensory: No sensory deficit.      Motor: Motor function is intact.      Deep Tendon Reflexes: Reflexes are normal and symmetric.     LAB WORK: Laboratory testing discussed.    Assessment/Plan   Problem List Items Addressed This Visit             ICD-10-CM       Cardiac and Vasculature    Hypertension I10    Relevant Medications    losartan (Cozaar) 100 mg tablet       Endocrine/Metabolic    Pre-diabetes R73.03       Gastrointestinal and Abdominal    Gastroesophageal reflux disease K21.9       " Musculoskeletal and Injuries    Backache M54.9     Other Visit Diagnoses         Codes    Overweight    -  Primary E66.3    High cholesterol     E78.00    History of carpal tunnel surgery     Z98.890        1. Overweight, pre-diabetic.  We are going to try [facility] is the dispensing pharmacy given to him.  He is willing to pay.  It is going to be out of pocket.  2. Hypertension, stable.  3. High cholesterol, stable.  4. Status post carpal tunnel surgery.  We will monitor.  5. Back pain.  6. Follow-up appointment with me in a month.  7. GERD, on PPI.    Scribe Attestation  By signing my name below, I, Shelly Kunz, Felix attest that this documentation has been prepared under the direction and in the presence of Jose Antonio Capellan MD.

## 2023-11-20 ENCOUNTER — ANCILLARY PROCEDURE (OUTPATIENT)
Dept: RADIOLOGY | Facility: CLINIC | Age: 63
End: 2023-11-20
Payer: COMMERCIAL

## 2023-11-20 DIAGNOSIS — M54.50 ACUTE BILATERAL LOW BACK PAIN WITHOUT SCIATICA: ICD-10-CM

## 2023-11-20 PROCEDURE — 72148 MRI LUMBAR SPINE W/O DYE: CPT

## 2023-11-20 PROCEDURE — 72148 MRI LUMBAR SPINE W/O DYE: CPT | Performed by: RADIOLOGY

## 2023-12-04 ENCOUNTER — OFFICE VISIT (OUTPATIENT)
Dept: NEUROSURGERY | Facility: CLINIC | Age: 63
End: 2023-12-04
Payer: COMMERCIAL

## 2023-12-04 VITALS
HEIGHT: 70 IN | HEART RATE: 103 BPM | BODY MASS INDEX: 37.22 KG/M2 | DIASTOLIC BLOOD PRESSURE: 94 MMHG | WEIGHT: 260 LBS | SYSTOLIC BLOOD PRESSURE: 171 MMHG | RESPIRATION RATE: 20 BRPM

## 2023-12-04 DIAGNOSIS — M47.812 CERVICAL SPONDYLOSIS WITHOUT MYELOPATHY: Primary | ICD-10-CM

## 2023-12-04 PROCEDURE — 1036F TOBACCO NON-USER: CPT | Performed by: NEUROLOGICAL SURGERY

## 2023-12-04 PROCEDURE — 99212 OFFICE O/P EST SF 10 MIN: CPT | Performed by: NEUROLOGICAL SURGERY

## 2023-12-04 PROCEDURE — 3077F SYST BP >= 140 MM HG: CPT | Performed by: NEUROLOGICAL SURGERY

## 2023-12-04 PROCEDURE — 3080F DIAST BP >= 90 MM HG: CPT | Performed by: NEUROLOGICAL SURGERY

## 2023-12-04 ASSESSMENT — PAIN SCALES - GENERAL: PAINLEVEL: 4

## 2023-12-04 NOTE — PROGRESS NOTES
Ache neck pain last visit asked for EMG. Has right greater than left and having sleeping issues because of it. Does not go away. Has had PT.    63-year-old man with neck pain and hand numbness returns for follow-up.  Since his last visit, the patient had an EMG that demonstrated bilateral median nerve entrapment.  The patient underwent a right sided carpal tunnel release.  He does not know if this has helped with his right hand symptoms but he continues to complain of pain in his neck and numbness in both hands.  The patient denies any symptoms running all the way down his arms.    On physical exam, the patient is alert and interactive.  He has a healing incision on his right wrist.  He moves upper extremities symmetrically.    The etiology of the patient's symptoms remain unclear.  He does not have any classic radiculopathy or dermatomal motor or sensory loss.  There was no evidence of cervical radiculopathy on his EMG examination.  As such, despite radiographic evidence of foraminal stenosis, I be very hesitant to proceed with any surgical intervention on his neck.  The patient has not had any injections in many years.  I believe might be worthwhile for him to get a pain medicine evaluation to see if nonoperative therapy can help with symptom control.

## 2023-12-05 ENCOUNTER — TELEPHONE (OUTPATIENT)
Dept: PRIMARY CARE | Facility: CLINIC | Age: 63
End: 2023-12-05
Payer: COMMERCIAL

## 2023-12-05 NOTE — TELEPHONE ENCOUNTER
----- Message from Jose Antonio Capellan MD sent at 11/20/2023  6:57 PM EST -----  Make an appt 5 - 7 days

## 2024-01-04 ENCOUNTER — OFFICE VISIT (OUTPATIENT)
Dept: ORTHOPEDIC SURGERY | Facility: CLINIC | Age: 64
End: 2024-01-04
Payer: COMMERCIAL

## 2024-01-04 DIAGNOSIS — M17.12 PRIMARY OSTEOARTHRITIS OF LEFT KNEE: Primary | ICD-10-CM

## 2024-01-04 DIAGNOSIS — M25.561 ACUTE PAIN OF BOTH KNEES: ICD-10-CM

## 2024-01-04 DIAGNOSIS — M25.562 ACUTE PAIN OF BOTH KNEES: ICD-10-CM

## 2024-01-04 PROCEDURE — 1036F TOBACCO NON-USER: CPT | Performed by: ORTHOPAEDIC SURGERY

## 2024-01-04 PROCEDURE — 99213 OFFICE O/P EST LOW 20 MIN: CPT | Performed by: ORTHOPAEDIC SURGERY

## 2024-01-04 PROCEDURE — 2500000005 HC RX 250 GENERAL PHARMACY W/O HCPCS: Performed by: ORTHOPAEDIC SURGERY

## 2024-01-04 PROCEDURE — 2500000004 HC RX 250 GENERAL PHARMACY W/ HCPCS (ALT 636 FOR OP/ED): Performed by: ORTHOPAEDIC SURGERY

## 2024-01-04 PROCEDURE — 20610 DRAIN/INJ JOINT/BURSA W/O US: CPT | Performed by: ORTHOPAEDIC SURGERY

## 2024-01-04 RX ORDER — LIDOCAINE HYDROCHLORIDE 10 MG/ML
4 INJECTION INFILTRATION; PERINEURAL
Status: COMPLETED | OUTPATIENT
Start: 2024-01-04 | End: 2024-01-04

## 2024-01-04 RX ORDER — TRIAMCINOLONE ACETONIDE 40 MG/ML
1 INJECTION, SUSPENSION INTRA-ARTICULAR; INTRAMUSCULAR
Status: COMPLETED | OUTPATIENT
Start: 2024-01-04 | End: 2024-01-04

## 2024-01-04 RX ADMIN — TRIAMCINOLONE ACETONIDE 1 ML: 40 INJECTION, SUSPENSION INTRA-ARTICULAR; INTRAMUSCULAR at 09:50

## 2024-01-04 RX ADMIN — LIDOCAINE HYDROCHLORIDE 4 ML: 10 INJECTION, SOLUTION INFILTRATION; PERINEURAL at 09:50

## 2024-01-04 ASSESSMENT — PAIN - FUNCTIONAL ASSESSMENT: PAIN_FUNCTIONAL_ASSESSMENT: 0-10

## 2024-01-04 ASSESSMENT — PAIN SCALES - GENERAL: PAINLEVEL_OUTOF10: 2

## 2024-01-04 NOTE — PROGRESS NOTES
Patient is a 63-year-old male presents today for evaluation of left knee osteoarthritis.  Has had previous cortisone injections.  He takes anti-inflammatories occasionally.  He has some decrease in station and weakness in his left foot secondary to some lumbar spine issues.  He previously underwent right total knee arthroplasty did well from that standpoint.    Left knee:  AAOx3, NAD, walks with a mild antalgic gait  Varus allignment  Range of motion lacks 10 degrees of full extension and flexes to 110 degrees  Stable to varus/valgus/anterior/posterior stress through out the range of motion  Slight laxity with varus stress  Diffuse medial  joint line tenderness to palpation  Moderate effusion  SILT in a lalit/saph/per/tib distribution  5/5 knee extension/df/pf/ehl  ½ dorsalis pedis and posterior tibial pulse  no popliteal lymphadenopathy  no other overlying lesions  mood: euthymic  Respirations non labored    Plain films were reviewed by myself in clinic today.  He has advanced osteoarthritis of his left knee with significant joint space narrowing, underlying sclerosis and osteophytic change.    We discussed conservative treatment today in clinic.  Ultimately he will benefit from total knee replacement.  He elected undergo a cortisone injection today in clinic.  He can repeat this every 3 to 4 months as needed.  All of his questions were answered.    L Inj/Asp: L knee on 1/4/2024 9:50 AM  Indications: pain and joint swelling  Details: 22 G needle, anterolateral approach  Medications: 4 mL lidocaine 10 mg/mL (1 %); 1 mL triamcinolone acetonide 40 mg/mL    Discussion:  I discussed the conservative treatment options for knee osteoarthritis including but not limited to physical therapy, oral NSAIDS, activity and lifestyle modification, and corticosteroid injections. Pt has elected to undergo a cortisone injection today. I have explained the risk and benefits of an injection including the possibility of joint infection,  bleeding, damage to cartilage, allergic reaction. Patient verbalized understanding and gave verbal consent wishes to proceed with a intra-articular cortisone injection for their knee.    Procedure:  After discussing the risk and benefits of the procedure, we proceeded with an intra-articular left knee injection.    With the patient's informed verbal consent, the left knee was prepped in standard sterile fashion with Chlorhexidine. The skin was then anesthetized with ethyl chloride spray and cleaned again with Chlorhexidine. The knee was then apirated/injected with a prefilled 20-gauge syringe of 40 mg Kenalog + 4 ml Lidocaine using the lateral approach without complications.  The patient tolerated this well and felt immediate initial relief of symptoms. A bandaid was applied and the patient ambulated out of the clinic on ther own accord without difficulty. Patient was instructed to avoid physical activity for 24-48 hours to prevent the knees from swelling and may ice the knees as tolerated. Patient should contact the office if any signs of of infection appear: redness, fever, chills, drainage, swelling or warmth to the knees.  Pt understands that the injections can be repeated no sooner than 3 months.  Procedure, treatment alternatives, risks and benefits explained, specific risks discussed. Consent was given by the patient. Immediately prior to procedure a time out was called to verify the correct patient, procedure, equipment, support staff and site/side marked as required. Patient was prepped and draped in the usual sterile fashion.

## 2024-01-16 ENCOUNTER — OFFICE VISIT (OUTPATIENT)
Dept: PRIMARY CARE | Facility: CLINIC | Age: 64
End: 2024-01-16
Payer: COMMERCIAL

## 2024-01-16 VITALS
DIASTOLIC BLOOD PRESSURE: 80 MMHG | HEIGHT: 70 IN | WEIGHT: 256 LBS | BODY MASS INDEX: 36.65 KG/M2 | SYSTOLIC BLOOD PRESSURE: 162 MMHG

## 2024-01-16 DIAGNOSIS — E03.9 HYPOTHYROIDISM, UNSPECIFIED TYPE: Primary | ICD-10-CM

## 2024-01-16 DIAGNOSIS — I10 PRIMARY HYPERTENSION: ICD-10-CM

## 2024-01-16 DIAGNOSIS — M25.569 KNEE PAIN, UNSPECIFIED CHRONICITY, UNSPECIFIED LATERALITY: ICD-10-CM

## 2024-01-16 DIAGNOSIS — E78.5 HYPERLIPIDEMIA, UNSPECIFIED HYPERLIPIDEMIA TYPE: ICD-10-CM

## 2024-01-16 DIAGNOSIS — R73.03 PRE-DIABETES: ICD-10-CM

## 2024-01-16 DIAGNOSIS — K21.9 GASTROESOPHAGEAL REFLUX DISEASE, UNSPECIFIED WHETHER ESOPHAGITIS PRESENT: ICD-10-CM

## 2024-01-16 PROCEDURE — 99213 OFFICE O/P EST LOW 20 MIN: CPT | Performed by: INTERNAL MEDICINE

## 2024-01-16 PROCEDURE — 1036F TOBACCO NON-USER: CPT | Performed by: INTERNAL MEDICINE

## 2024-01-16 PROCEDURE — 3077F SYST BP >= 140 MM HG: CPT | Performed by: INTERNAL MEDICINE

## 2024-01-16 PROCEDURE — 3079F DIAST BP 80-89 MM HG: CPT | Performed by: INTERNAL MEDICINE

## 2024-01-16 RX ORDER — OMEPRAZOLE 40 MG/1
CAPSULE, DELAYED RELEASE ORAL
Qty: 90 CAPSULE | Refills: 1 | Status: SHIPPED | OUTPATIENT
Start: 2024-01-16

## 2024-01-16 ASSESSMENT — ENCOUNTER SYMPTOMS
DEPRESSION: 0
OCCASIONAL FEELINGS OF UNSTEADINESS: 0
LOSS OF SENSATION IN FEET: 0

## 2024-01-17 ENCOUNTER — APPOINTMENT (OUTPATIENT)
Dept: PRIMARY CARE | Facility: CLINIC | Age: 64
End: 2024-01-17
Payer: COMMERCIAL

## 2024-01-17 NOTE — PROGRESS NOTES
"Subjective   Patient ID: Kevin Olea is a 63 y.o. male who presents for Follow-up (multiple medical issues).    This gentleman today came here for multiple medical issues.  This is his eighth week and he lost 11 pounds so far.  Blood work is okay.  No problem.  Knee surgery, he is going to have a surgery with Dr. Duval.  Neck and lower back, no surgery.  We are monitoring situation.  He came for follow-up on various conditions.  Appetite and weight are okay.  No problem.    I have personally reviewed the patient's Past Medical History, Medications, Allergies, Social History, and Family History in the EMR.    Review of Systems   All other systems reviewed and are negative.    Objective   /80   Ht 1.778 m (5' 10\")   Wt 116 kg (256 lb)   BMI 36.73 kg/m²     Physical Exam  Vitals reviewed.   Cardiovascular:      Heart sounds: Normal heart sounds, S1 normal and S2 normal. No murmur heard.     No friction rub.   Pulmonary:      Effort: Pulmonary effort is normal.      Breath sounds: Normal breath sounds and air entry.   Abdominal:      Palpations: There is no hepatomegaly, splenomegaly or mass.   Musculoskeletal:      Right lower leg: No edema.      Left lower leg: No edema.   Lymphadenopathy:      Lower Body: No right inguinal adenopathy. No left inguinal adenopathy.   Neurological:      Cranial Nerves: Cranial nerves 2-12 are intact.      Sensory: No sensory deficit.      Motor: Motor function is intact.      Deep Tendon Reflexes: Reflexes are normal and symmetric.     LAB WORK: Laboratory testing discussed.    Assessment/Plan   Problem List Items Addressed This Visit             ICD-10-CM       Cardiac and Vasculature    Hyperlipidemia E78.5    Relevant Orders    Lipid Panel    Hypertension I10    Relevant Orders    Comprehensive Metabolic Panel    Thyroid Stimulating Hormone       Endocrine/Metabolic    Hypothyroidism - Primary E03.9    Pre-diabetes R73.03    Relevant Orders    Hemoglobin A1C       " Gastrointestinal and Abdominal    Gastroesophageal reflux disease K21.9    Relevant Medications    omeprazole (PriLOSEC) 40 mg DR capsule     Other Visit Diagnoses         Codes    Knee pain, unspecified chronicity, unspecified laterality     M25.569          1. Weight loss, type 2 diabetes.  We are going to go on third level of the medication to make him lose weight and getting sugar right.  Blood work ordered.  2. Hypertension, okay.  3. High cholesterol, stable.  4. Thyroid, okay.  5. Knee pain, going for surgery.  6. Blood work discussed.  7. Follow-up after he comes back from work, probably in February.  I will be happy to see anytime sooner if necessary.    Scribe Attestation  By signing my name below, I, Arpita Ladd, Scrloie attest that this documentation has been prepared under the direction and in the presence of Jose Antonio Capellan MD.

## 2024-04-05 ENCOUNTER — APPOINTMENT (OUTPATIENT)
Dept: ORTHOPEDIC SURGERY | Facility: CLINIC | Age: 64
End: 2024-04-05
Payer: COMMERCIAL

## 2024-04-30 ENCOUNTER — HOSPITAL ENCOUNTER (OUTPATIENT)
Dept: RADIOLOGY | Facility: HOSPITAL | Age: 64
Discharge: HOME | End: 2024-04-30
Payer: COMMERCIAL

## 2024-04-30 ENCOUNTER — OFFICE VISIT (OUTPATIENT)
Dept: ORTHOPEDIC SURGERY | Facility: CLINIC | Age: 64
End: 2024-04-30
Payer: COMMERCIAL

## 2024-04-30 DIAGNOSIS — S46.212A BICEPS RUPTURE, PROXIMAL, LEFT, INITIAL ENCOUNTER: Primary | ICD-10-CM

## 2024-04-30 DIAGNOSIS — M79.602 LEFT ARM PAIN: ICD-10-CM

## 2024-04-30 DIAGNOSIS — S43.432A GLENOID LABRAL TEAR, LEFT, INITIAL ENCOUNTER: ICD-10-CM

## 2024-04-30 DIAGNOSIS — R73.03 PRE-DIABETES: ICD-10-CM

## 2024-04-30 DIAGNOSIS — Z86.79 HISTORY OF HEART DISEASE: ICD-10-CM

## 2024-04-30 DIAGNOSIS — I10 HYPERTENSION, UNSPECIFIED TYPE: ICD-10-CM

## 2024-04-30 PROCEDURE — 73060 X-RAY EXAM OF HUMERUS: CPT | Mod: LEFT SIDE | Performed by: RADIOLOGY

## 2024-04-30 PROCEDURE — 99214 OFFICE O/P EST MOD 30 MIN: CPT | Performed by: ORTHOPAEDIC SURGERY

## 2024-04-30 PROCEDURE — 1036F TOBACCO NON-USER: CPT | Performed by: ORTHOPAEDIC SURGERY

## 2024-04-30 PROCEDURE — L3670 SO ACRO/CLAV CAN WEB PRE OTS: HCPCS | Performed by: ORTHOPAEDIC SURGERY

## 2024-04-30 PROCEDURE — 99204 OFFICE O/P NEW MOD 45 MIN: CPT | Performed by: ORTHOPAEDIC SURGERY

## 2024-04-30 PROCEDURE — 73060 X-RAY EXAM OF HUMERUS: CPT | Mod: LT

## 2024-04-30 RX ORDER — SODIUM CHLORIDE, SODIUM LACTATE, POTASSIUM CHLORIDE, CALCIUM CHLORIDE 600; 310; 30; 20 MG/100ML; MG/100ML; MG/100ML; MG/100ML
100 INJECTION, SOLUTION INTRAVENOUS CONTINUOUS
Status: CANCELLED | OUTPATIENT
Start: 2024-04-30

## 2024-04-30 RX ORDER — CEFAZOLIN SODIUM 2 G/100ML
2 INJECTION, SOLUTION INTRAVENOUS ONCE
Status: CANCELLED | OUTPATIENT
Start: 2024-04-30 | End: 2024-04-30

## 2024-04-30 ASSESSMENT — ENCOUNTER SYMPTOMS
SHORTNESS OF BREATH: 0
ARTHRALGIAS: 1
FATIGUE: 0
WHEEZING: 0
CHILLS: 0
FEVER: 0

## 2024-04-30 ASSESSMENT — PAIN - FUNCTIONAL ASSESSMENT: PAIN_FUNCTIONAL_ASSESSMENT: 0-10

## 2024-04-30 ASSESSMENT — PAIN SCALES - GENERAL: PAINLEVEL_OUTOF10: 5 - MODERATE PAIN

## 2024-04-30 NOTE — PROGRESS NOTES
Reason for Appointment  Chief Complaint   Patient presents with    Right Shoulder - Pain     Poss torn bicep towards elbow?     History of Present Illness  New patient is a 63 y.o. male here today for evaluation of pain in left shoulder. X-rays today of left humerus show some mild shoulder DJD, no humerus or elbow abnormalities. Patient has had right knee replaced. In 2013, I repaired his right distal biceps. Sunday at 11am, Pt was lifting a van tire when his proximal bicep ruptured. Pt c/o shoulder and upper arm pain and denies radiation to the elbow. Denies past injury or treatment to left shoulder. Denies numbness, neck pain, or other issues. Has had a history of cervical stenosis but is not having any current related issues      Past Medical History:   Diagnosis Date    Personal history of other specified conditions 05/17/2013    History of fatigue       Past Surgical History:   Procedure Laterality Date    EYE SURGERY  07/01/2013    Eye Surgery    OTHER SURGICAL HISTORY  07/01/2013    Wrist Surgery       Medication Documentation Review Audit       Reviewed by Alyssa Freeman MA (Medical Assistant) on 04/30/24 at 0913      Medication Order Taking? Sig Documenting Provider Last Dose Status    mg tablet [id number] Yes Take 1 tablet (600 mg) by mouth every 6 hours if needed. Historical Provider, MD Taking Active   losartan (Cozaar) 100 mg tablet [id number] Yes Take 1 tablet (100 mg) by mouth once daily. Jose Antonio Capellan MD Taking Active   omeprazole (PriLOSEC) 40 mg DR capsule [id number] Yes TAKE ONE CAPSULE BY MOUTH DAILY (NEED APPOINTMENT - LAST FILL) Jose Antonio Capellan MD Taking Active   pravastatin (Pravachol) 80 mg tablet [id number] Yes Take 1 tablet (80 mg) by mouth once daily. Jose Antonio Capellan MD Taking Active   SEMAGLUTIDE SUBQ [id number] Yes Inject 1 mg under the skin every 7 days. Patient gets through [facility]. Phoned to Dom at the pharmacy 01/16/2024.   (5mg-0.4mg/ml) Give  0.2ml(1mg) subcutaneously once a week. Historical Provider, MD Taking Active   tadalafil (Cialis) 5 mg tablet [id number] Yes Take 1 tablet (5 mg) by mouth once daily. Historical Provider, MD Taking Active   tamsulosin (Flomax) 0.4 mg 24 hr capsule [id number] Yes Take 1 capsule (0.4 mg) by mouth once daily. Jose Antonio Capellan MD Taking Active   traMADol (Ultram) 50 mg tablet [id number] Yes 1 tablet EVERY 6 HOURS (route: oral) Historical Provider, MD Taking Active                    No Known Allergies    Review of Systems   Constitutional:  Negative for chills, fatigue and fever.   Respiratory:  Negative for shortness of breath and wheezing.    Cardiovascular:  Negative for chest pain and leg swelling.   Musculoskeletal:  Positive for arthralgias.   All other systems reviewed and are negative.      Exam     On exam of L shoulder, excellent cervical ROM, no bony tenderness, no scapula or trapezial tenderness no AC or SC tenderness, full forward flexion, tender over proximal biceps, good rotator cuff strength in external and internal rotation, good deltoid function, significant Igor deformity with tenderness of the biceps, distal biceps intact, good elbow, wrist, and hand ROM, good pulses, good sensation, no skin changes, no hyperreflexia, negative Morales's sign.    Assessment   Encounter Diagnoses   Name Primary?    Left arm pain     History of heart disease     Hypertension, unspecified type     Pre-diabetes     Biceps rupture, proximal, left, initial encounter Yes       Plan     We had a long discussion about non-operative and operative treatment of biceps rupture. I would like to get a STAT MRI of his left shoulder to assure no significant rotator cuff tear or SLAP tear at the rupture site. Based on the extent of the deformity and patient's activity level, patient would like to go ahead with surgery. We had a long discussion about the risks, benefits, and alternatives to surgery. Patient understands the procedure and  recovery fully. Plan for left proximal biceps reinsertion likely 5/10/24.      I, Angeles Castillo, attest that this documentation has been prepared under the direction and in the presence of Kota Gabriel MD. By signing below, I, Kota Gabriel MD, personally performed the services described in this documentation. All medical record entries made by the scribe were at my direction and in my presence. I have reviewed the chart and agree that the record reflects my personal performance and is accurate and complete. 04/30/24

## 2024-04-30 NOTE — H&P (VIEW-ONLY)
Reason for Appointment  Chief Complaint   Patient presents with    Right Shoulder - Pain     Poss torn bicep towards elbow?     History of Present Illness  New patient is a 63 y.o. male here today for evaluation of pain in left shoulder. X-rays today of left humerus show some mild shoulder DJD, no humerus or elbow abnormalities. Patient has had right knee replaced. In 2013, I repaired his right distal biceps. Sunday at 11am, Pt was lifting a van tire when his proximal bicep ruptured. Pt c/o shoulder and upper arm pain and denies radiation to the elbow. Denies past injury or treatment to left shoulder. Denies numbness, neck pain, or other issues. Has had a history of cervical stenosis but is not having any current related issues      Past Medical History:   Diagnosis Date    Personal history of other specified conditions 05/17/2013    History of fatigue       Past Surgical History:   Procedure Laterality Date    EYE SURGERY  07/01/2013    Eye Surgery    OTHER SURGICAL HISTORY  07/01/2013    Wrist Surgery       Medication Documentation Review Audit       Reviewed by Alyssa Freeman MA (Medical Assistant) on 04/30/24 at 0913      Medication Order Taking? Sig Documenting Provider Last Dose Status    mg tablet [id number] Yes Take 1 tablet (600 mg) by mouth every 6 hours if needed. Historical Provider, MD Taking Active   losartan (Cozaar) 100 mg tablet [id number] Yes Take 1 tablet (100 mg) by mouth once daily. Jose Antonio Capellan MD Taking Active   omeprazole (PriLOSEC) 40 mg DR capsule [id number] Yes TAKE ONE CAPSULE BY MOUTH DAILY (NEED APPOINTMENT - LAST FILL) Jose Antonio Capellan MD Taking Active   pravastatin (Pravachol) 80 mg tablet [id number] Yes Take 1 tablet (80 mg) by mouth once daily. Jose Antonio Capellan MD Taking Active   SEMAGLUTIDE SUBQ [id number] Yes Inject 1 mg under the skin every 7 days. Patient gets through [facility]. Phoned to Dom at the pharmacy 01/16/2024.   (5mg-0.4mg/ml) Give  0.2ml(1mg) subcutaneously once a week. Historical Provider, MD Taking Active   tadalafil (Cialis) 5 mg tablet [id number] Yes Take 1 tablet (5 mg) by mouth once daily. Historical Provider, MD Taking Active   tamsulosin (Flomax) 0.4 mg 24 hr capsule [id number] Yes Take 1 capsule (0.4 mg) by mouth once daily. Jose Antonio Capellan MD Taking Active   traMADol (Ultram) 50 mg tablet [id number] Yes 1 tablet EVERY 6 HOURS (route: oral) Historical Provider, MD Taking Active                    No Known Allergies    Review of Systems   Constitutional:  Negative for chills, fatigue and fever.   Respiratory:  Negative for shortness of breath and wheezing.    Cardiovascular:  Negative for chest pain and leg swelling.   Musculoskeletal:  Positive for arthralgias.   All other systems reviewed and are negative.      Exam     On exam of L shoulder, excellent cervical ROM, no bony tenderness, no scapula or trapezial tenderness no AC or SC tenderness, full forward flexion, tender over proximal biceps, good rotator cuff strength in external and internal rotation, good deltoid function, significant Igor deformity with tenderness of the biceps, distal biceps intact, good elbow, wrist, and hand ROM, good pulses, good sensation, no skin changes, no hyperreflexia, negative Mroales's sign.    Assessment   Encounter Diagnoses   Name Primary?    Left arm pain     History of heart disease     Hypertension, unspecified type     Pre-diabetes     Biceps rupture, proximal, left, initial encounter Yes       Plan     We had a long discussion about non-operative and operative treatment of biceps rupture. I would like to get a STAT MRI of his left shoulder to assure no significant rotator cuff tear or SLAP tear at the rupture site. Based on the extent of the deformity and patient's activity level, patient would like to go ahead with surgery. We had a long discussion about the risks, benefits, and alternatives to surgery. Patient understands the procedure and  recovery fully. Plan for left proximal biceps reinsertion likely 5/10/24.      I, Angeles Castillo, attest that this documentation has been prepared under the direction and in the presence of Kota Gabriel MD. By signing below, I, Kota Gabriel MD, personally performed the services described in this documentation. All medical record entries made by the scribe were at my direction and in my presence. I have reviewed the chart and agree that the record reflects my personal performance and is accurate and complete. 04/30/24

## 2024-05-01 ENCOUNTER — PREP FOR PROCEDURE (OUTPATIENT)
Dept: OPERATING ROOM | Facility: HOSPITAL | Age: 64
End: 2024-05-01
Payer: COMMERCIAL

## 2024-05-01 DIAGNOSIS — S46.212A BICEPS RUPTURE, PROXIMAL, LEFT, INITIAL ENCOUNTER: Primary | ICD-10-CM

## 2024-05-04 ENCOUNTER — HOSPITAL ENCOUNTER (OUTPATIENT)
Dept: RADIOLOGY | Facility: HOSPITAL | Age: 64
Discharge: HOME | End: 2024-05-04
Payer: COMMERCIAL

## 2024-05-04 DIAGNOSIS — S43.432A GLENOID LABRAL TEAR, LEFT, INITIAL ENCOUNTER: ICD-10-CM

## 2024-05-04 DIAGNOSIS — S46.212A BICEPS RUPTURE, PROXIMAL, LEFT, INITIAL ENCOUNTER: ICD-10-CM

## 2024-05-04 PROCEDURE — 73221 MRI JOINT UPR EXTREM W/O DYE: CPT | Mod: LEFT SIDE | Performed by: RADIOLOGY

## 2024-05-04 PROCEDURE — 73221 MRI JOINT UPR EXTREM W/O DYE: CPT | Mod: LT

## 2024-05-06 ENCOUNTER — APPOINTMENT (OUTPATIENT)
Dept: PREADMISSION TESTING | Facility: HOSPITAL | Age: 64
End: 2024-05-06
Payer: COMMERCIAL

## 2024-05-07 ENCOUNTER — TELEMEDICINE CLINICAL SUPPORT (OUTPATIENT)
Dept: PREADMISSION TESTING | Facility: HOSPITAL | Age: 64
End: 2024-05-07
Payer: COMMERCIAL

## 2024-05-07 DIAGNOSIS — I10 HYPERTENSION, UNSPECIFIED TYPE: Primary | ICD-10-CM

## 2024-05-07 RX ORDER — ASPIRIN 81 MG/1
81 TABLET ORAL DAILY
COMMUNITY

## 2024-05-07 RX ORDER — ACETAMINOPHEN 500 MG
500 TABLET ORAL EVERY 8 HOURS PRN
COMMUNITY

## 2024-05-07 NOTE — PREPROCEDURE INSTRUCTIONS
Current Medications   Medication Instructions    acetaminophen (Tylenol) 500 mg tablet Take as needed    aspirin 81 mg EC tablet As directed by prescriber    losartan (Cozaar) 100 mg tablet Stop 1 day before surgery    omeprazole (PriLOSEC) 40 mg DR capsule Take morning of surgery with sip of water, no other fluids    SEMAGLUTIDE SUBQ Stop 7 days before surgery                       NPO Instructions:    Do not eat any food after midnight the night before your surgery/procedure.    Additional Instructions:     Five Days before Surgery:  Review your medication instructions, stop indicated medications  Three Days before Surgery:  Review your medication instructions, stop indicated medications  The Day before Surgery:  Review your medication instructions, stop indicated medications  You will be contacted regarding the time of your arrival to facility and surgery time  Do not eat any food after Midnight  Day of Surgery:  Review your medication instructions, take indicated medications  Wear  comfortable loose fitting clothing  Do not use moisturizers, creams, lotions or perfume  All jewelry and valuables should be left at home  PAT PRE-OPERATIVE INSTRUCTIONS    [facility]  [address] [place] [address]  [phone]    Please let your surgeon know if:      You develop:  Open sores, shingles, burning or painful urination as these may increase your risk of an infection.   Fever=100.4 or greater   New or worsening cold or flu symptoms ( cough, shortness of breath, sore throat, respiratory distress, headache, fatigue, GI symptoms)   You no longer wish to have the surgery.   Any other personal circumstances change that may lead to the need to cancel or defer this surgery-such as being sick or getting admitted to any hospital within one week of your planned procedure.    Your contact details change, such as a change of address or phone number.    Starting now:     Please DO NOT  drink alcohol or smoke for 24 hours before surgery. It is well known that quitting smoking can make a huge difference to your health and recovery from surgery. The longer you abstain from smoking, the better your chances of a healthy recovery. If you need help with quitting, call 7-800QUIT-NOW to be connected to a trained counselor who will discuss the best methods to help you quit.     Before your surgery:    Please stop all supplements/ vitamins 7 days prior to surgery (or as directed by your surgeon).   Please stop taking NSAID pain medicine such as Advil, Ibuprofen, and Motrin 7 days before surgery.    If you develop any fever, cough, cold, rashes, cuts, scratches, scrapes, urinary symptoms or infection anywhere on your body (including teeth and gums) prior to surgery, please call your surgeon’s office as soon as possible. This may require treatment to reduce the chance of cancellation on the day of surgery.    The day before your surgery:   DIET- Do not eat any food after MIDNIGHT.   Get a good night’s rest.  Use the special soap for bathing if you have been instructed to use one.    Scheduled surgery times may change and you will be notified if this occurs - please check your personal voicemail for any updates.     On the morning of surgery:   Wear comfortable, loose fitting clothes which open in the front.   Shower and please do not wear moisturizers, creams, lotions, deodorants, makeup or perfume.    Please bring with you to surgery:   Photo ID and insurance card   Current list of medicines and allergies   Pacemaker/ Defibrillator/Heart stent cards as well as remote controls for implanted devices    CPAP machine and mask    Slings/ splints/ crutches   A copy of your complete advanced directive/DHPOA.    Please do NOT bring with you to surgery:   All jewelry and valuables should be left at home.   Prosthetic devices such as contact lenses, glasses, hearing aids, dentures, eyelash extensions, hairpins and  body piercings must be removed prior to going in to the surgical suite. If you have a case for these items, please bring it with you on surgery day.    *Patients under the age of 18: A responsible adult must be present and remaining in the building throughout the surgical visit.    After outpatient surgery:   A responsible adult MUST accompany you at the time of discharge and stay with you for 24 hours after your surgery. You may NOT drive yourself home after surgery.    Do not drive, operate machinery, make critical decisions or do activities that require co-ordination or balance until after a night’s sleep.   Do not drink alcoholic beverages for 24 hours.   Instructions for resuming your medications will be provided by your surgeon.    CALL YOUR DOCTOR AFTER SURGERY IF YOU HAVE:     Chills and/or a fever of 101° F or higher.    Redness, swelling, pus or drainage from your surgical wound or a bad smell from the wound.    Lightheadedness, fainting or confusion.    Persistent vomiting (throwing up) and are not able to eat or drink for 12 hours.    Three or more loose, watery bowel movements in 24 hours (diarrhea).   Difficulty or pain while urinating( after non-urological surgery)    Pain and swelling in your legs, especially if it is only on one side.    Difficulty breathing or are breathing faster than normal.    Any new concerning symptoms.      Reviewed pre-op instructions with patient, states understanding and denies further questions at this time.      Take Care Kevin!

## 2024-05-07 NOTE — PERIOPERATIVE NURSING NOTE
Message sent to Dr. Najera, Dr. Gabriel and Trinidad Chandra to notify patient is out of town until days before surgery, he has not had EKG since 10-22 (hx includes SUAD with CPAP, HTN, HLD, GERD) and last labs were 10-28-23.

## 2024-05-10 ENCOUNTER — ANESTHESIA EVENT (OUTPATIENT)
Dept: OPERATING ROOM | Facility: HOSPITAL | Age: 64
End: 2024-05-10
Payer: COMMERCIAL

## 2024-05-10 ENCOUNTER — ANESTHESIA (OUTPATIENT)
Dept: OPERATING ROOM | Facility: HOSPITAL | Age: 64
End: 2024-05-10
Payer: COMMERCIAL

## 2024-05-10 ENCOUNTER — HOSPITAL ENCOUNTER (OUTPATIENT)
Facility: HOSPITAL | Age: 64
Setting detail: OUTPATIENT SURGERY
Discharge: HOME | End: 2024-05-10
Attending: ORTHOPAEDIC SURGERY | Admitting: ORTHOPAEDIC SURGERY
Payer: COMMERCIAL

## 2024-05-10 VITALS
DIASTOLIC BLOOD PRESSURE: 79 MMHG | WEIGHT: 240 LBS | HEIGHT: 70 IN | SYSTOLIC BLOOD PRESSURE: 104 MMHG | TEMPERATURE: 96.8 F | RESPIRATION RATE: 19 BRPM | HEART RATE: 58 BPM | OXYGEN SATURATION: 98 % | BODY MASS INDEX: 34.36 KG/M2

## 2024-05-10 DIAGNOSIS — S46.212A BICEPS RUPTURE, PROXIMAL, LEFT, INITIAL ENCOUNTER: Primary | ICD-10-CM

## 2024-05-10 PROCEDURE — 3700000002 HC GENERAL ANESTHESIA TIME - EACH INCREMENTAL 1 MINUTE: Performed by: ORTHOPAEDIC SURGERY

## 2024-05-10 PROCEDURE — 64415 NJX AA&/STRD BRCH PLXS IMG: CPT | Performed by: NURSE ANESTHETIST, CERTIFIED REGISTERED

## 2024-05-10 PROCEDURE — 2500000001 HC RX 250 WO HCPCS SELF ADMINISTERED DRUGS (ALT 637 FOR MEDICARE OP): Performed by: ANESTHESIOLOGY

## 2024-05-10 PROCEDURE — C1713 ANCHOR/SCREW BN/BN,TIS/BN: HCPCS | Performed by: ORTHOPAEDIC SURGERY

## 2024-05-10 PROCEDURE — 2500000004 HC RX 250 GENERAL PHARMACY W/ HCPCS (ALT 636 FOR OP/ED): Performed by: ORTHOPAEDIC SURGERY

## 2024-05-10 PROCEDURE — 2780000003 HC OR 278 NO HCPCS: Performed by: ORTHOPAEDIC SURGERY

## 2024-05-10 PROCEDURE — 7100000009 HC PHASE TWO TIME - INITIAL BASE CHARGE: Performed by: ORTHOPAEDIC SURGERY

## 2024-05-10 PROCEDURE — 7100000002 HC RECOVERY ROOM TIME - EACH INCREMENTAL 1 MINUTE: Performed by: ORTHOPAEDIC SURGERY

## 2024-05-10 PROCEDURE — 3700000001 HC GENERAL ANESTHESIA TIME - INITIAL BASE CHARGE: Performed by: ORTHOPAEDIC SURGERY

## 2024-05-10 PROCEDURE — 2500000005 HC RX 250 GENERAL PHARMACY W/O HCPCS: Performed by: NURSE ANESTHETIST, CERTIFIED REGISTERED

## 2024-05-10 PROCEDURE — A23430 PR REPAIR BICEPS LONG TENDON: Performed by: ANESTHESIOLOGY

## 2024-05-10 PROCEDURE — 2500000004 HC RX 250 GENERAL PHARMACY W/ HCPCS (ALT 636 FOR OP/ED): Performed by: NURSE ANESTHETIST, CERTIFIED REGISTERED

## 2024-05-10 PROCEDURE — A23430 PR REPAIR BICEPS LONG TENDON: Performed by: NURSE ANESTHETIST, CERTIFIED REGISTERED

## 2024-05-10 PROCEDURE — 23430 REPAIR BICEPS TENDON: CPT | Performed by: ORTHOPAEDIC SURGERY

## 2024-05-10 PROCEDURE — 2500000004 HC RX 250 GENERAL PHARMACY W/ HCPCS (ALT 636 FOR OP/ED): Performed by: PHYSICIAN ASSISTANT

## 2024-05-10 PROCEDURE — 2500000004 HC RX 250 GENERAL PHARMACY W/ HCPCS (ALT 636 FOR OP/ED): Performed by: ANESTHESIOLOGY

## 2024-05-10 PROCEDURE — 3600000003 HC OR TIME - INITIAL BASE CHARGE - PROCEDURE LEVEL THREE: Performed by: ORTHOPAEDIC SURGERY

## 2024-05-10 PROCEDURE — 29823 SHO ARTHRS SRG XTNSV DBRDMT: CPT | Performed by: ORTHOPAEDIC SURGERY

## 2024-05-10 PROCEDURE — 7100000010 HC PHASE TWO TIME - EACH INCREMENTAL 1 MINUTE: Performed by: ORTHOPAEDIC SURGERY

## 2024-05-10 PROCEDURE — 3600000008 HC OR TIME - EACH INCREMENTAL 1 MINUTE - PROCEDURE LEVEL THREE: Performed by: ORTHOPAEDIC SURGERY

## 2024-05-10 PROCEDURE — A4217 STERILE WATER/SALINE, 500 ML: HCPCS | Performed by: ORTHOPAEDIC SURGERY

## 2024-05-10 PROCEDURE — 2720000007 HC OR 272 NO HCPCS: Performed by: ORTHOPAEDIC SURGERY

## 2024-05-10 PROCEDURE — A4649 SURGICAL SUPPLIES: HCPCS | Performed by: ORTHOPAEDIC SURGERY

## 2024-05-10 PROCEDURE — 2500000004 HC RX 250 GENERAL PHARMACY W/ HCPCS (ALT 636 FOR OP/ED): Mod: JZ | Performed by: PHYSICIAN ASSISTANT

## 2024-05-10 PROCEDURE — 7100000001 HC RECOVERY ROOM TIME - INITIAL BASE CHARGE: Performed by: ORTHOPAEDIC SURGERY

## 2024-05-10 DEVICE — PROXIMAL TENODESIS IMPLANT SYSTEM REV: 0
Type: IMPLANTABLE DEVICE | Site: SHOULDER | Status: FUNCTIONAL
Brand: ARTHREX®

## 2024-05-10 RX ORDER — OXYCODONE AND ACETAMINOPHEN 5; 325 MG/1; MG/1
1 TABLET ORAL EVERY 6 HOURS PRN
Qty: 20 TABLET | Refills: 0 | Status: SHIPPED | OUTPATIENT
Start: 2024-05-10 | End: 2024-05-15

## 2024-05-10 RX ORDER — FENTANYL CITRATE 50 UG/ML
50 INJECTION, SOLUTION INTRAMUSCULAR; INTRAVENOUS ONCE
Status: COMPLETED | OUTPATIENT
Start: 2024-05-10 | End: 2024-05-10

## 2024-05-10 RX ORDER — FENTANYL CITRATE 50 UG/ML
INJECTION, SOLUTION INTRAMUSCULAR; INTRAVENOUS AS NEEDED
Status: DISCONTINUED | OUTPATIENT
Start: 2024-05-10 | End: 2024-05-10

## 2024-05-10 RX ORDER — MEPERIDINE HYDROCHLORIDE 25 MG/ML
12.5 INJECTION INTRAMUSCULAR; INTRAVENOUS; SUBCUTANEOUS EVERY 10 MIN PRN
Status: DISCONTINUED | OUTPATIENT
Start: 2024-05-10 | End: 2024-05-10 | Stop reason: HOSPADM

## 2024-05-10 RX ORDER — KETOROLAC TROMETHAMINE 30 MG/ML
INJECTION, SOLUTION INTRAMUSCULAR; INTRAVENOUS AS NEEDED
Status: DISCONTINUED | OUTPATIENT
Start: 2024-05-10 | End: 2024-05-10

## 2024-05-10 RX ORDER — CEFAZOLIN SODIUM 2 G/100ML
2 INJECTION, SOLUTION INTRAVENOUS ONCE
Status: COMPLETED | OUTPATIENT
Start: 2024-05-10 | End: 2024-05-10

## 2024-05-10 RX ORDER — ONDANSETRON HYDROCHLORIDE 2 MG/ML
4 INJECTION, SOLUTION INTRAVENOUS ONCE AS NEEDED
Status: DISCONTINUED | OUTPATIENT
Start: 2024-05-10 | End: 2024-05-10 | Stop reason: HOSPADM

## 2024-05-10 RX ORDER — DOXYCYCLINE 100 MG/1
100 CAPSULE ORAL 2 TIMES DAILY
Qty: 14 CAPSULE | Refills: 0 | Status: SHIPPED | OUTPATIENT
Start: 2024-05-10 | End: 2024-05-17

## 2024-05-10 RX ORDER — LABETALOL HYDROCHLORIDE 5 MG/ML
10 INJECTION, SOLUTION INTRAVENOUS ONCE AS NEEDED
Status: DISCONTINUED | OUTPATIENT
Start: 2024-05-10 | End: 2024-05-10 | Stop reason: HOSPADM

## 2024-05-10 RX ORDER — PROPOFOL 10 MG/ML
INJECTION, EMULSION INTRAVENOUS AS NEEDED
Status: DISCONTINUED | OUTPATIENT
Start: 2024-05-10 | End: 2024-05-10

## 2024-05-10 RX ORDER — LABETALOL HYDROCHLORIDE 5 MG/ML
INJECTION, SOLUTION INTRAVENOUS AS NEEDED
Status: DISCONTINUED | OUTPATIENT
Start: 2024-05-10 | End: 2024-05-10

## 2024-05-10 RX ORDER — HYDRALAZINE HYDROCHLORIDE 20 MG/ML
10 INJECTION INTRAMUSCULAR; INTRAVENOUS EVERY 30 MIN PRN
Status: DISCONTINUED | OUTPATIENT
Start: 2024-05-10 | End: 2024-05-10 | Stop reason: HOSPADM

## 2024-05-10 RX ORDER — DIPHENHYDRAMINE HYDROCHLORIDE 50 MG/ML
12.5 INJECTION INTRAMUSCULAR; INTRAVENOUS ONCE AS NEEDED
Status: DISCONTINUED | OUTPATIENT
Start: 2024-05-10 | End: 2024-05-10 | Stop reason: HOSPADM

## 2024-05-10 RX ORDER — ROCURONIUM BROMIDE 10 MG/ML
INJECTION, SOLUTION INTRAVENOUS AS NEEDED
Status: DISCONTINUED | OUTPATIENT
Start: 2024-05-10 | End: 2024-05-10

## 2024-05-10 RX ORDER — ALBUTEROL SULFATE 0.83 MG/ML
2.5 SOLUTION RESPIRATORY (INHALATION) ONCE
Status: DISCONTINUED | OUTPATIENT
Start: 2024-05-10 | End: 2024-05-10 | Stop reason: HOSPADM

## 2024-05-10 RX ORDER — MIDAZOLAM HYDROCHLORIDE 1 MG/ML
2 INJECTION, SOLUTION INTRAMUSCULAR; INTRAVENOUS ONCE
Status: COMPLETED | OUTPATIENT
Start: 2024-05-10 | End: 2024-05-10

## 2024-05-10 RX ORDER — METOCLOPRAMIDE 10 MG/1
10 TABLET ORAL ONCE
Status: COMPLETED | OUTPATIENT
Start: 2024-05-10 | End: 2024-05-10

## 2024-05-10 RX ORDER — BUPIVACAINE HYDROCHLORIDE 5 MG/ML
INJECTION, SOLUTION PERINEURAL AS NEEDED
Status: DISCONTINUED | OUTPATIENT
Start: 2024-05-10 | End: 2024-05-10

## 2024-05-10 RX ORDER — ALBUTEROL SULFATE 0.83 MG/ML
2.5 SOLUTION RESPIRATORY (INHALATION) ONCE AS NEEDED
Status: DISCONTINUED | OUTPATIENT
Start: 2024-05-10 | End: 2024-05-10 | Stop reason: HOSPADM

## 2024-05-10 RX ORDER — KETOROLAC TROMETHAMINE 15 MG/ML
15 INJECTION, SOLUTION INTRAMUSCULAR; INTRAVENOUS ONCE AS NEEDED
Status: DISCONTINUED | OUTPATIENT
Start: 2024-05-10 | End: 2024-05-10 | Stop reason: HOSPADM

## 2024-05-10 RX ORDER — DEXAMETHASONE SODIUM PHOSPHATE 10 MG/ML
INJECTION INTRAMUSCULAR; INTRAVENOUS AS NEEDED
Status: DISCONTINUED | OUTPATIENT
Start: 2024-05-10 | End: 2024-05-10

## 2024-05-10 RX ORDER — ONDANSETRON HYDROCHLORIDE 2 MG/ML
INJECTION, SOLUTION INTRAVENOUS AS NEEDED
Status: DISCONTINUED | OUTPATIENT
Start: 2024-05-10 | End: 2024-05-10

## 2024-05-10 RX ORDER — OXYCODONE HCL 10 MG/1
10 TABLET, FILM COATED, EXTENDED RELEASE ORAL ONCE AS NEEDED
Status: COMPLETED | OUTPATIENT
Start: 2024-05-10 | End: 2024-05-10

## 2024-05-10 RX ORDER — FAMOTIDINE 20 MG/1
20 TABLET, FILM COATED ORAL ONCE
Status: DISCONTINUED | OUTPATIENT
Start: 2024-05-10 | End: 2024-05-10 | Stop reason: HOSPADM

## 2024-05-10 RX ORDER — SODIUM CHLORIDE, SODIUM LACTATE, POTASSIUM CHLORIDE, CALCIUM CHLORIDE 600; 310; 30; 20 MG/100ML; MG/100ML; MG/100ML; MG/100ML
100 INJECTION, SOLUTION INTRAVENOUS CONTINUOUS
Status: DISCONTINUED | OUTPATIENT
Start: 2024-05-10 | End: 2024-05-10 | Stop reason: HOSPADM

## 2024-05-10 RX ORDER — LATANOPROST 50 UG/ML
1 SOLUTION/ DROPS OPHTHALMIC NIGHTLY
COMMUNITY

## 2024-05-10 RX ADMIN — SODIUM CHLORIDE, SODIUM LACTATE, POTASSIUM CHLORIDE, AND CALCIUM CHLORIDE: 600; 310; 30; 20 INJECTION, SOLUTION INTRAVENOUS at 11:15

## 2024-05-10 RX ADMIN — LABETALOL HYDROCHLORIDE 2.5 MG: 5 INJECTION INTRAVENOUS at 11:21

## 2024-05-10 RX ADMIN — HYDROMORPHONE HYDROCHLORIDE 0.5 MG: 1 INJECTION, SOLUTION INTRAMUSCULAR; INTRAVENOUS; SUBCUTANEOUS at 12:06

## 2024-05-10 RX ADMIN — SODIUM CHLORIDE, SODIUM LACTATE, POTASSIUM CHLORIDE, AND CALCIUM CHLORIDE 100 ML/HR: 600; 310; 30; 20 INJECTION, SOLUTION INTRAVENOUS at 09:26

## 2024-05-10 RX ADMIN — FENTANYL CITRATE 50 MCG: 50 INJECTION INTRAMUSCULAR; INTRAVENOUS at 10:31

## 2024-05-10 RX ADMIN — CEFAZOLIN SODIUM 2 G: 2 INJECTION, SOLUTION INTRAVENOUS at 10:43

## 2024-05-10 RX ADMIN — BUPIVACAINE HYDROCHLORIDE 20 ML: 5 INJECTION, SOLUTION PERINEURAL at 10:35

## 2024-05-10 RX ADMIN — LABETALOL HYDROCHLORIDE 2.5 MG: 5 INJECTION INTRAVENOUS at 11:25

## 2024-05-10 RX ADMIN — ROCURONIUM BROMIDE 50 MG: 10 INJECTION, SOLUTION INTRAVENOUS at 10:53

## 2024-05-10 RX ADMIN — KETOROLAC TROMETHAMINE 30 MG: 30 INJECTION, SOLUTION INTRAMUSCULAR at 11:27

## 2024-05-10 RX ADMIN — DEXAMETHASONE SODIUM PHOSPHATE 5 MG: 10 INJECTION, SOLUTION INTRAMUSCULAR; INTRAVENOUS at 10:35

## 2024-05-10 RX ADMIN — MIDAZOLAM 2 MG: 1 INJECTION INTRAMUSCULAR; INTRAVENOUS at 10:31

## 2024-05-10 RX ADMIN — PROPOFOL 200 MG: 10 INJECTION, EMULSION INTRAVENOUS at 10:51

## 2024-05-10 RX ADMIN — FENTANYL CITRATE 100 MCG: 50 INJECTION INTRAMUSCULAR; INTRAVENOUS at 10:59

## 2024-05-10 RX ADMIN — METOCLOPRAMIDE 10 MG: 10 TABLET ORAL at 09:30

## 2024-05-10 RX ADMIN — SODIUM CHLORIDE, SODIUM LACTATE, POTASSIUM CHLORIDE, AND CALCIUM CHLORIDE: 600; 310; 30; 20 INJECTION, SOLUTION INTRAVENOUS at 10:46

## 2024-05-10 RX ADMIN — OXYCODONE HYDROCHLORIDE 10 MG: 10 TABLET, FILM COATED, EXTENDED RELEASE ORAL at 12:24

## 2024-05-10 RX ADMIN — ONDANSETRON 4 MG: 2 INJECTION INTRAMUSCULAR; INTRAVENOUS at 11:27

## 2024-05-10 SDOH — HEALTH STABILITY: MENTAL HEALTH: CURRENT SMOKER: 0

## 2024-05-10 ASSESSMENT — PAIN - FUNCTIONAL ASSESSMENT
PAIN_FUNCTIONAL_ASSESSMENT: 0-10

## 2024-05-10 ASSESSMENT — PAIN SCALES - GENERAL
PAINLEVEL_OUTOF10: 6
PAINLEVEL_OUTOF10: 7
PAIN_LEVEL: 2
PAINLEVEL_OUTOF10: 4
PAINLEVEL_OUTOF10: 0 - NO PAIN
PAINLEVEL_OUTOF10: 0 - NO PAIN
PAINLEVEL_OUTOF10: 7
PAINLEVEL_OUTOF10: 0 - NO PAIN

## 2024-05-10 ASSESSMENT — COLUMBIA-SUICIDE SEVERITY RATING SCALE - C-SSRS
1. IN THE PAST MONTH, HAVE YOU WISHED YOU WERE DEAD OR WISHED YOU COULD GO TO SLEEP AND NOT WAKE UP?: NO
6. HAVE YOU EVER DONE ANYTHING, STARTED TO DO ANYTHING, OR PREPARED TO DO ANYTHING TO END YOUR LIFE?: NO
1. IN THE PAST MONTH, HAVE YOU WISHED YOU WERE DEAD OR WISHED YOU COULD GO TO SLEEP AND NOT WAKE UP?: NO
2. HAVE YOU ACTUALLY HAD ANY THOUGHTS OF KILLING YOURSELF?: NO
6. HAVE YOU EVER DONE ANYTHING, STARTED TO DO ANYTHING, OR PREPARED TO DO ANYTHING TO END YOUR LIFE?: NO
2. HAVE YOU ACTUALLY HAD ANY THOUGHTS OF KILLING YOURSELF?: NO

## 2024-05-10 NOTE — ANESTHESIA POSTPROCEDURE EVALUATION
"Patient: Kevin Olea    Procedure Summary       Date: 05/10/24 Room / Location: [facility] / [facility]    Anesthesia Start: 1046 Anesthesia Stop: 1149    Procedure: Repair Tendon with or without Tendon Graft Upper Extremity ( arthrex ) (Left: Shoulder) Diagnosis:       Biceps rupture, proximal, left, initial encounter      (Biceps rupture, proximal, left, initial encounter [S46.212A])    Surgeons: Kota Gabriel MD Responsible Provider: Robin Izquierdo DO    Anesthesia Type: general ASA Status: 3            Anesthesia Type: general  /80   Pulse 65   Temp 36.4 °C (97.5 °F) (Temporal)   Resp 16   Ht 1.778 m (5' 10\")   Wt 109 kg (240 lb)   SpO2 96%   BMI 34.44 kg/m²         Anesthesia Post Evaluation    Patient location during evaluation: bedside  Patient participation: complete - patient participated  Level of consciousness: awake  Pain score: 2  Pain management: adequate  Airway patency: patent  Two or more strategies used to mitigate risk of obstructive sleep apnea  Cardiovascular status: acceptable  Respiratory status: acceptable  Hydration status: acceptable  Postoperative Nausea and Vomiting: none        No notable events documented.    "

## 2024-05-10 NOTE — ANESTHESIA PROCEDURE NOTES
Airway  Date/Time: 5/10/2024 11:09 AM  Urgency: elective    Airway not difficult    Staffing  Performed: CRNA   Authorized by: Robin Izquierdo DO    Performed by: STEVEN Pan-TAWANNA  Patient location during procedure: OR    Indications and Patient Condition  Indications for airway management: anesthesia and airway protection  Spontaneous Ventilation: absent  Sedation level: deep  Preoxygenated: yes  Patient position: sniffing  MILS maintained throughout  Mask difficulty assessment: 1 - vent by mask  No planned trial extubation    Final Airway Details  Final airway type: endotracheal airway      Successful airway: ETT  Cuffed: yes   Successful intubation technique: direct laryngoscopy  Facilitating devices/methods: intubating stylet  Endotracheal tube insertion site: oral  Blade: Valerio  Blade size: #4  ETT size (mm): 7.5  Placement verified by: chest auscultation and capnometry   Measured from: lips  Number of attempts at approach: 1  Ventilation between attempts: none  Number of other approaches attempted: 0    Additional Comments  Atraumatic intubation, dentition unchanged.

## 2024-05-10 NOTE — ANESTHESIA PROCEDURE NOTES
Peripheral Block    Patient location during procedure: pre-op  Start time: 5/10/2024 10:33 AM  End time: 5/10/2024 10:35 AM  Reason for block: at surgeon's request and post-op pain management  Staffing  Performed: CRNA   Authorized by: Robin Izquierdo DO    Performed by: RYAN Pan  Preanesthetic Checklist  Completed: patient identified, IV checked, site marked, risks and benefits discussed, surgical consent, monitors and equipment checked, pre-op evaluation and timeout performed   Timeout performed at: 5/10/2024 10:30 AM  Peripheral Block  Patient position: sitting  Prep: ChloraPrep  Patient monitoring: heart rate, cardiac monitor and continuous pulse ox  Block type: brachial plexus and interscalene  Injection technique: single-shot  Guidance: ultrasound guided  Local infiltration: lidocaine  Infiltration strength: 1 %  Dose: 1 mL  Needle  Needle type: short-bevel   Needle gauge: 22 G  Needle length: 5 cm  Needle localization: anatomical landmarks and ultrasound guidance  Assessment  Injection assessment: negative aspiration for heme, no paresthesia on injection, incremental injection and local visualized surrounding nerve on ultrasound  Paresthesia pain: none  Heart rate change: no  Slow fractionated injection: yes  Additional Notes  Bupivacaine 0.5% with epi 1:300,000  -   20 ml  Decadron PF 5mg/0.5ml    Local drawn up by Dr. Izquierdo and block administered by DANIE Vogel CRNA

## 2024-05-10 NOTE — OP NOTE
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 more than glenoid with labral tear and joint synovitis     Procedures  Repair Tendon with or without Tendon Graft Upper Extremity ( arthrex )  44066 - NE TENODESIS LONG TENDON BICEPS  #1 left proximal biceps tenodesis  #2 left shoulder arthroscopic major joint debridement of chondromalacia labral tear synovitis and undersurface cuff tear    Surgeons      * Kota Gabriel - Primary    Resident/Fellow/Other Assistant:  Surgeons and Role:  * No surgeons found with a matching role *    Procedure Summary  Anesthesia: General  ASA: III  Anesthesia Staff: Anesthesiologist: Robin Izquierdo DO  CRNA: STEVEN Pan-CRNA  Estimated Blood Loss: 5mL  Intra-op Medications:   Administrations occurring from 1015 to 1145 on 05/10/24:   Medication Name Total Dose   EPINEPHrine (Adrenalin) 1 mg in sodium chloride 0.9 % 3,000 mL irrigation 6,000 mL   lactated Ringer's infusion 983.33 mL   ceFAZolin in dextrose (iso-os) (Ancef) IVPB 2 g 2 g   fentaNYL PF (Sublimaze) injection 50 mcg 50 mcg   midazolam (Versed) injection 2 mg 2 mg              Anesthesia Record               Intraprocedure I/O Totals          Intake    lactated Ringer's infusion 1500.00 mL    Total Intake 1500 mL          Specimen: No specimens collected     Staff:   Circulator: Baylee Pillai RN  Scrub Person: Maria Isabel Fernández PA-C; Kathrine Mills         Drains and/or Catheters: * None in log *    Tourniquet Times:          Implants:  Implants       Type Name Action Serial No.      Implant KIT, IMPLANT SYSTEM, PROXIMAL TENODESIS - [id number] Implanted               Findings: As above    Indications: Kevin Olea is an 64 y.o. male who is having surgery for Biceps rupture, proximal, left, initial encounter [S46.717A].  Naina patient comes in today and we talked about arthroscopic versus open procedure and we decided together arthroscopic today because of the torn labrum to assure that he does not have a bucket-handle type tear or something more significant we will debride this area but also at the time of surgery we found the arthritic changes affect his long-term outcome and we debrided out the shoulder but no subscapularis tear no significant cuff injury which will be important for possible replacement of the shoulder if he needs it down in the long-term.  Wish to proceed understands risk benefits alternatives such as nerve artery tendon damage infection continued pain wished proceed, informed consent obtained    The patient was seen in the preoperative area. The risks, benefits, complications, treatment options, non-operative alternatives, expected recovery and outcomes were discussed with the patient. The possibilities of reaction to medication, pulmonary aspiration, injury to surrounding structures, bleeding, recurrent infection, the need for additional procedures, failure to diagnose a condition, and creating a complication requiring transfusion or operation were discussed with the patient. The patient concurred with the proposed plan, giving informed consent.  The site of surgery was properly noted/marked if necessary per policy. The patient has been actively warmed in preoperative area. Preoperative antibiotics have been ordered and given within 2 hours of incision. Venous thrombosis prophylaxis have been ordered including bilateral sequential compression devices    Procedure Details: Naina patient brought the  operating room and after sterilely prepped and draped informed timeout was placed in the right lateral decubitus position well-padded on beanbag with an axillary roll.  At this point we easily entered the joint immediately saw the undersurface fraying of the cuff but a large area of grade III and IV chondromalacia that was debrided back to a stable edge large labral tear but no severe labral detachment no subscapularis tear but abundant synovitis we debrided out the joint fully and cleaned out the joint from the labral tear back to a stable edge.    At this point we turned our attention anteriorly made a 4 cm subpectoral incision easily harvesting the torn biceps that was severely distally retracted we did not Arthrex unicortical Endobutton proximal biceps tenodesis placing good tension back on the biceps but not overtensioning.  This had an excellent repair we copious irrigated closed wound light compressive dressing was placed sling was placed there were no complications.  Complications:  None; patient tolerated the procedure well.    Disposition: PACU - hemodynamically stable.  Condition: stable         Additional Details: 0    Attending Attestation: I performed the procedure.    Kota Gabriel  Phone Number: [phone]

## 2024-05-10 NOTE — ANESTHESIA PREPROCEDURE EVALUATION
Patient: Kevin Olea    Procedure Information       Date/Time: 05/10/24 1015    Procedure: Repair Tendon with or without Tendon Graft Upper Extremity ( arthrex ) (Left: Shoulder)    Location: [facility] / [facility]    Surgeons: Kota Gabriel MD            Relevant Problems   Cardiac   (+) Chest pain   (+) Hyperlipidemia   (+) Hypertension   (+) Low-density-lipoid-type (LDL) hyperlipoproteinemia      Neuro   (+) Anxiety      GI   (+) Gastroesophageal reflux disease      Endocrine   (+) Hypothyroidism      Musculoskeletal   (+) Cervical spondylosis without myelopathy   (+) Spinal stenosis, lumbar region, with neurogenic claudication      HEENT   (+) Acute sinusitis      ID   (+) Infected finger      Skin   (+) Rash       Clinical information reviewed:   Tobacco  Allergies  Meds   Med Hx  Surg Hx   Fam Hx  Soc Hx        NPO Detail:  No data recorded     Physical Exam    Airway  Mallampati: II  TM distance: >3 FB  Neck ROM: full     Cardiovascular   Rhythm: regular  Rate: normal     Dental - normal exam     Pulmonary   Breath sounds clear to auscultation     Abdominal   Abdomen: soft             Anesthesia Plan    History of general anesthesia?: yes  History of complications of general anesthesia?: no    ASA 3     general     The patient is not a current smoker.    intravenous induction   Postoperative administration of opioids is intended.  Anesthetic plan and risks discussed with patient.    Plan discussed with CRNA, attending and CAA.

## 2024-05-10 NOTE — PERIOPERATIVE NURSING NOTE
"Pt received from OR via cart, monitors on, report received. Pt awake, appropriate. VSS. Pt states mild \"pit pain\" on operative arm, ice pack applied. Pt educated on post op pain expectations.  Orders reviewed/released.  "

## 2024-05-21 ENCOUNTER — OFFICE VISIT (OUTPATIENT)
Dept: ORTHOPEDIC SURGERY | Facility: CLINIC | Age: 64
End: 2024-05-21
Payer: COMMERCIAL

## 2024-05-21 DIAGNOSIS — S46.212A BICEPS RUPTURE, PROXIMAL, LEFT, INITIAL ENCOUNTER: Primary | ICD-10-CM

## 2024-05-21 PROCEDURE — 1036F TOBACCO NON-USER: CPT | Performed by: ORTHOPAEDIC SURGERY

## 2024-05-21 PROCEDURE — 99024 POSTOP FOLLOW-UP VISIT: CPT | Performed by: ORTHOPAEDIC SURGERY

## 2024-05-21 ASSESSMENT — PAIN - FUNCTIONAL ASSESSMENT: PAIN_FUNCTIONAL_ASSESSMENT: 0-10

## 2024-05-21 ASSESSMENT — PAIN SCALES - GENERAL: PAINLEVEL_OUTOF10: 2

## 2024-05-21 NOTE — PROGRESS NOTES
Reason for Appointment  Chief Complaint   Patient presents with    Right Shoulder - Post-op     History of Present Illness  Patient is here today 2 weeks s/p a left shoulder arthroscopic major joint debridement and biceps tenodesis on 5/10/24. Patient is doing well overall.  Wounds are healing nicely with no signs of infection, sutures were removed today.  He already has good shoulder motion today and he did have a significant amount of arthritis in the shoulder and he does understand it is important to regain motion quickly.  He understands being careful with heavy lifting and activity and we did go over home stretches with him today.  We went over his intraoperative pictures and he understands postoperative protocol fully.  We will follow-up with him in 4 weeks.    Assessment   Left proximal biceps rupture

## 2024-07-09 DIAGNOSIS — I10 PRIMARY HYPERTENSION: ICD-10-CM

## 2024-07-09 RX ORDER — LOSARTAN POTASSIUM 100 MG/1
100 TABLET ORAL DAILY
Qty: 30 TABLET | Refills: 0 | Status: SHIPPED | OUTPATIENT
Start: 2024-07-09

## 2024-08-31 ENCOUNTER — LAB (OUTPATIENT)
Dept: LAB | Facility: LAB | Age: 64
End: 2024-08-31
Payer: COMMERCIAL

## 2024-08-31 DIAGNOSIS — R73.03 PRE-DIABETES: ICD-10-CM

## 2024-08-31 DIAGNOSIS — E78.5 HYPERLIPIDEMIA, UNSPECIFIED HYPERLIPIDEMIA TYPE: ICD-10-CM

## 2024-08-31 DIAGNOSIS — I10 PRIMARY HYPERTENSION: ICD-10-CM

## 2024-08-31 LAB
ALBUMIN SERPL BCP-MCNC: 4.1 G/DL (ref 3.4–5)
ALP SERPL-CCNC: 43 U/L (ref 33–136)
ALT SERPL W P-5'-P-CCNC: 47 U/L (ref 10–52)
ANION GAP SERPL CALC-SCNC: 12 MMOL/L (ref 10–20)
AST SERPL W P-5'-P-CCNC: 36 U/L (ref 9–39)
BILIRUB SERPL-MCNC: 0.5 MG/DL (ref 0–1.2)
BUN SERPL-MCNC: 13 MG/DL (ref 6–23)
CALCIUM SERPL-MCNC: 9.4 MG/DL (ref 8.6–10.6)
CHLORIDE SERPL-SCNC: 103 MMOL/L (ref 98–107)
CHOLEST SERPL-MCNC: 251 MG/DL (ref 0–199)
CHOLESTEROL/HDL RATIO: 6.5
CO2 SERPL-SCNC: 29 MMOL/L (ref 21–32)
CREAT SERPL-MCNC: 1.01 MG/DL (ref 0.5–1.3)
EGFRCR SERPLBLD CKD-EPI 2021: 84 ML/MIN/1.73M*2
EST. AVERAGE GLUCOSE BLD GHB EST-MCNC: 117 MG/DL
GLUCOSE SERPL-MCNC: 113 MG/DL (ref 74–99)
HBA1C MFR BLD: 5.7 %
HDLC SERPL-MCNC: 38.5 MG/DL
LDLC SERPL CALC-MCNC: 168 MG/DL
NON HDL CHOLESTEROL: 213 MG/DL (ref 0–149)
POTASSIUM SERPL-SCNC: 4.8 MMOL/L (ref 3.5–5.3)
PROT SERPL-MCNC: 6.5 G/DL (ref 6.4–8.2)
SODIUM SERPL-SCNC: 139 MMOL/L (ref 136–145)
TRIGL SERPL-MCNC: 224 MG/DL (ref 0–149)
TSH SERPL-ACNC: 2.02 MIU/L (ref 0.44–3.98)
VLDL: 45 MG/DL (ref 0–40)

## 2024-08-31 PROCEDURE — 36415 COLL VENOUS BLD VENIPUNCTURE: CPT

## 2024-08-31 PROCEDURE — 80061 LIPID PANEL: CPT

## 2024-08-31 PROCEDURE — 80053 COMPREHEN METABOLIC PANEL: CPT

## 2024-08-31 PROCEDURE — 84443 ASSAY THYROID STIM HORMONE: CPT

## 2024-08-31 PROCEDURE — 83036 HEMOGLOBIN GLYCOSYLATED A1C: CPT

## 2024-09-05 ENCOUNTER — APPOINTMENT (OUTPATIENT)
Dept: PRIMARY CARE | Facility: CLINIC | Age: 64
End: 2024-09-05
Payer: COMMERCIAL

## 2024-09-05 VITALS
WEIGHT: 255 LBS | DIASTOLIC BLOOD PRESSURE: 82 MMHG | BODY MASS INDEX: 36.51 KG/M2 | HEIGHT: 70 IN | SYSTOLIC BLOOD PRESSURE: 146 MMHG

## 2024-09-05 DIAGNOSIS — K21.9 GASTROESOPHAGEAL REFLUX DISEASE, UNSPECIFIED WHETHER ESOPHAGITIS PRESENT: ICD-10-CM

## 2024-09-05 DIAGNOSIS — E78.5 HYPERLIPIDEMIA, UNSPECIFIED HYPERLIPIDEMIA TYPE: ICD-10-CM

## 2024-09-05 DIAGNOSIS — R97.20 ELEVATED PSA: ICD-10-CM

## 2024-09-05 DIAGNOSIS — E11.9 TYPE 2 DIABETES MELLITUS WITHOUT COMPLICATION, UNSPECIFIED WHETHER LONG TERM INSULIN USE (MULTI): Primary | ICD-10-CM

## 2024-09-05 DIAGNOSIS — I10 PRIMARY HYPERTENSION: ICD-10-CM

## 2024-09-05 DIAGNOSIS — Z12.5 ENCOUNTER FOR PROSTATE CANCER SCREENING: ICD-10-CM

## 2024-09-05 PROCEDURE — 99214 OFFICE O/P EST MOD 30 MIN: CPT | Performed by: INTERNAL MEDICINE

## 2024-09-05 PROCEDURE — 3050F LDL-C >= 130 MG/DL: CPT | Performed by: INTERNAL MEDICINE

## 2024-09-05 PROCEDURE — 3008F BODY MASS INDEX DOCD: CPT | Performed by: INTERNAL MEDICINE

## 2024-09-05 PROCEDURE — 3077F SYST BP >= 140 MM HG: CPT | Performed by: INTERNAL MEDICINE

## 2024-09-05 PROCEDURE — 4010F ACE/ARB THERAPY RXD/TAKEN: CPT | Performed by: INTERNAL MEDICINE

## 2024-09-05 PROCEDURE — 3044F HG A1C LEVEL LT 7.0%: CPT | Performed by: INTERNAL MEDICINE

## 2024-09-05 PROCEDURE — 3079F DIAST BP 80-89 MM HG: CPT | Performed by: INTERNAL MEDICINE

## 2024-09-05 RX ORDER — PRAVASTATIN SODIUM 80 MG/1
80 TABLET ORAL DAILY
Qty: 90 TABLET | Refills: 1 | Status: SHIPPED | OUTPATIENT
Start: 2024-09-05

## 2024-09-05 RX ORDER — TADALAFIL 5 MG/1
5 TABLET ORAL DAILY
Qty: 90 TABLET | Refills: 1 | Status: SHIPPED | OUTPATIENT
Start: 2024-09-05

## 2024-09-05 RX ORDER — LOSARTAN POTASSIUM 100 MG/1
100 TABLET ORAL DAILY
Qty: 30 TABLET | Refills: 0 | Status: SHIPPED | OUTPATIENT
Start: 2024-09-05

## 2024-09-05 RX ORDER — OMEPRAZOLE 40 MG/1
CAPSULE, DELAYED RELEASE ORAL
Qty: 90 CAPSULE | Refills: 1 | Status: SHIPPED | OUTPATIENT
Start: 2024-09-05

## 2024-09-05 RX ORDER — TAMSULOSIN HYDROCHLORIDE 0.4 MG/1
0.4 CAPSULE ORAL DAILY
Qty: 90 CAPSULE | Refills: 1 | Status: SHIPPED | OUTPATIENT
Start: 2024-09-05

## 2024-09-06 ENCOUNTER — PHARMACY VISIT (OUTPATIENT)
Dept: PHARMACY | Facility: CLINIC | Age: 64
End: 2024-09-06
Payer: MEDICARE

## 2024-09-06 PROCEDURE — RXMED WILLOW AMBULATORY MEDICATION CHARGE

## 2024-09-06 RX ORDER — TIRZEPATIDE 2.5 MG/.5ML
INJECTION, SOLUTION SUBCUTANEOUS
Qty: 2 ML | Refills: 0 | OUTPATIENT
Start: 2024-09-05

## 2024-09-06 NOTE — PROGRESS NOTES
"Subjective   Patient ID: Kevin Olea is a 64 y.o. male who presents for Follow-up.    1. It is always a delight to Kevin.  He is very busy, traveling a lot.  He could not do blood work.  He ran out of medications, numbers are high.  He has gained weight.  2. When he took generic Ozempic, semaglutide, he lost weight.  Sugar got under control.  Things are better, but he ran out.  He lives by himself.  He is not there to receive his drug and as a result of that, things are not doing good and he is very unhappy about it.  He came for follow-up on various conditions.    I have personally reviewed the patient's Past Medical History, Medications, Allergies, Social History, and Family History in the EMR.    Review of Systems   All other systems reviewed and are negative.    Objective   /82   Ht 1.778 m (5' 10\")   Wt 116 kg (255 lb)   BMI 36.59 kg/m²     Physical Exam  Vitals reviewed.   Cardiovascular:      Heart sounds: Normal heart sounds, S1 normal and S2 normal. No murmur heard.     No friction rub.   Pulmonary:      Effort: Pulmonary effort is normal.      Breath sounds: Normal breath sounds and air entry.   Abdominal:      Palpations: There is no hepatomegaly, splenomegaly or mass.   Musculoskeletal:      Right lower leg: No edema.      Left lower leg: No edema.   Lymphadenopathy:      Lower Body: No right inguinal adenopathy. No left inguinal adenopathy.   Neurological:      Cranial Nerves: Cranial nerves 2-12 are intact.      Sensory: No sensory deficit.      Motor: Motor function is intact.      Deep Tendon Reflexes: Reflexes are normal and symmetric.     LAB WORK: Laboratory testing discussed.    Assessment/Plan   Problem List Items Addressed This Visit             ICD-10-CM       Cardiac and Vasculature    Hyperlipidemia E78.5    Relevant Orders    Comprehensive Metabolic Panel    Lipid Panel    Hypertension I10    Relevant Medications    losartan (Cozaar) 100 mg tablet    Other Relevant Orders    " CBC       Gastrointestinal and Abdominal    Gastroesophageal reflux disease K21.9    Relevant Medications    omeprazole (PriLOSEC) 40 mg DR capsule    pravastatin (Pravachol) 80 mg tablet     Other Visit Diagnoses         Codes    Type 2 diabetes mellitus without complication, unspecified whether long term insulin use (Multi)    -  Primary E11.9    Elevated PSA     R97.20    Relevant Medications    tamsulosin (Flomax) 0.4 mg 24 hr capsule    tadalafil (Cialis) 5 mg tablet    Encounter for prostate cancer screening     Z12.5    Relevant Medications    tadalafil (Cialis) 5 mg tablet    Other Relevant Orders    Prostate Specific Antigen, Screen        1. Type 2 diabetes, good control with mono agent, he cannot take metformin, he travels a lot.  He has to attend business ______, he gets diarrhea.  Generic Ozempic works for him, I think insurance should cover, drug covered on insurance either Mounjaro, Ozempic, or Trulicity, one of them should be covered.  I think he needs it for type 2 diabetes, weight control and for metabolism.  2. Hypertension, excellent.  Kidney okay.  3. Cholesterol.  Monitor.  Restart medication.  4. Prostate health.  PSA due.  5. Follow-up in November for physical.  All questions answered.    Scribe Attestation  By signing my name below, I, Felix Watts attest that this documentation has been prepared under the direction and in the presence of Jose Antonio Capellan MD.

## 2024-09-24 ENCOUNTER — OFFICE VISIT (OUTPATIENT)
Dept: PRIMARY CARE | Facility: CLINIC | Age: 64
End: 2024-09-24
Payer: COMMERCIAL

## 2024-09-24 VITALS
HEIGHT: 70 IN | DIASTOLIC BLOOD PRESSURE: 78 MMHG | WEIGHT: 251 LBS | BODY MASS INDEX: 35.93 KG/M2 | SYSTOLIC BLOOD PRESSURE: 140 MMHG

## 2024-09-24 DIAGNOSIS — E11.9 TYPE 2 DIABETES MELLITUS WITHOUT COMPLICATION, UNSPECIFIED WHETHER LONG TERM INSULIN USE (MULTI): ICD-10-CM

## 2024-09-24 DIAGNOSIS — M54.9 BACK PAIN, UNSPECIFIED BACK LOCATION, UNSPECIFIED BACK PAIN LATERALITY, UNSPECIFIED CHRONICITY: ICD-10-CM

## 2024-09-24 DIAGNOSIS — L23.7 POISON IVY: ICD-10-CM

## 2024-09-24 PROCEDURE — 3078F DIAST BP <80 MM HG: CPT | Performed by: INTERNAL MEDICINE

## 2024-09-24 PROCEDURE — 99214 OFFICE O/P EST MOD 30 MIN: CPT | Performed by: INTERNAL MEDICINE

## 2024-09-24 PROCEDURE — 4010F ACE/ARB THERAPY RXD/TAKEN: CPT | Performed by: INTERNAL MEDICINE

## 2024-09-24 PROCEDURE — 3050F LDL-C >= 130 MG/DL: CPT | Performed by: INTERNAL MEDICINE

## 2024-09-24 PROCEDURE — 3008F BODY MASS INDEX DOCD: CPT | Performed by: INTERNAL MEDICINE

## 2024-09-24 PROCEDURE — 3044F HG A1C LEVEL LT 7.0%: CPT | Performed by: INTERNAL MEDICINE

## 2024-09-24 PROCEDURE — RXMED WILLOW AMBULATORY MEDICATION CHARGE

## 2024-09-24 PROCEDURE — 3077F SYST BP >= 140 MM HG: CPT | Performed by: INTERNAL MEDICINE

## 2024-09-24 RX ORDER — TIRZEPATIDE 5 MG/.5ML
5 INJECTION, SOLUTION SUBCUTANEOUS WEEKLY
Qty: 2 ML | Refills: 0 | Status: SHIPPED | OUTPATIENT
Start: 2024-09-24

## 2024-09-24 RX ORDER — PREDNISONE 10 MG/1
TABLET ORAL
Qty: 18 TABLET | Refills: 0 | Status: SHIPPED | OUTPATIENT
Start: 2024-09-24 | End: 2024-10-02

## 2024-09-24 RX ORDER — CETIRIZINE HYDROCHLORIDE 10 MG/1
10 TABLET ORAL DAILY
Qty: 30 TABLET | Refills: 0 | Status: SHIPPED | OUTPATIENT
Start: 2024-09-24 | End: 2025-09-24

## 2024-09-24 RX ORDER — TRIAMCINOLONE ACETONIDE 1 MG/G
CREAM TOPICAL 2 TIMES DAILY
Qty: 15 G | Refills: 0 | Status: SHIPPED | OUTPATIENT
Start: 2024-09-24

## 2024-09-25 NOTE — PROGRESS NOTES
"Subjective   Patient ID: Kevin Olea is a 64 y.o. male who presents for Rash.    This gentleman today came here for multiple medical issues.  1.  He has itchy, scratchy rash and poison ivy.  2.  Excruciating back pain.  He had an MRI.  3.  He developed a rash in the back of the ear.  It is not getting any better.  4.  Weight loss program.  He is taking shot.  It is working good for him.  He came here for follow-up.    I have personally reviewed the patient's Past Medical History, Medications, Allergies, Social History, and Family History in the EMR.    Rash    Review of Systems   Skin:  Positive for rash.   All other systems reviewed and are negative.    Objective   /78   Ht 1.778 m (5' 10\")   Wt 114 kg (251 lb)   BMI 36.01 kg/m²     Physical Exam  Vitals reviewed.   Cardiovascular:      Heart sounds: Normal heart sounds, S1 normal and S2 normal. No murmur heard.     No friction rub.   Pulmonary:      Effort: Pulmonary effort is normal.      Breath sounds: Normal breath sounds and air entry.   Abdominal:      Palpations: There is no hepatomegaly, splenomegaly or mass.   Musculoskeletal:      Right lower leg: No edema.      Left lower leg: No edema.   Lymphadenopathy:      Lower Body: No right inguinal adenopathy. No left inguinal adenopathy.   Skin:     Comments: Rash present looks like a poison ivy.  Back of the right ear looks like a small basal cell.   Neurological:      Cranial Nerves: Cranial nerves 2-12 are intact.      Sensory: No sensory deficit.      Motor: Motor function is intact.      Deep Tendon Reflexes: Reflexes are normal and symmetric.     LAB WORK:  Laboratory testing discussed.    Assessment/Plan   Problem List Items Addressed This Visit             ICD-10-CM       Musculoskeletal and Injuries    Backache M54.9    Relevant Orders    Referral to Neurosurgery     Other Visit Diagnoses         Codes    Type 2 diabetes mellitus without complication, unspecified whether long term insulin " use (Multi)     E11.9    Relevant Medications    tirzepatide (Mounjaro) 5 mg/0.5 mL pen injector    Poison ivy     L23.7    Relevant Medications    predniSONE (Deltasone) 10 mg tablet    cetirizine (ZyrTEC) 10 mg tablet    triamcinolone (Kenalog) 0.1 % cream        1. Poison ivy.  Solu-Medrol shot given.  Prednisone, Zyrtec, triamcinolone.  2. Back pain with radiculopathy.  He needs revision surgery at , done by  ______.  Referred to Dr. Devon Arita.  3. Skin rash on the ear.  He will see dermatologist for possible biopsy.  4. Weight loss and pre-diabetic.  Current shot is working.  No side effects.  I am going to go up on the dose.  I will continue to follow.  5. I will continue to see him in about four to five weeks.    Scribe Attestation  By signing my name below, IShelly Scribe attest that this documentation has been prepared under the direction and in the presence of Jose Antonio Capellan MD.

## 2024-09-27 ENCOUNTER — PHARMACY VISIT (OUTPATIENT)
Dept: PHARMACY | Facility: CLINIC | Age: 64
End: 2024-09-27
Payer: MEDICARE

## 2024-10-15 DIAGNOSIS — E11.9 TYPE 2 DIABETES MELLITUS WITHOUT COMPLICATION, UNSPECIFIED WHETHER LONG TERM INSULIN USE (MULTI): ICD-10-CM

## 2024-10-15 DIAGNOSIS — I10 PRIMARY HYPERTENSION: ICD-10-CM

## 2024-10-15 PROCEDURE — RXMED WILLOW AMBULATORY MEDICATION CHARGE

## 2024-10-15 RX ORDER — TIRZEPATIDE 7.5 MG/.5ML
7.5 INJECTION, SOLUTION SUBCUTANEOUS WEEKLY
Qty: 2 ML | Refills: 0 | Status: SHIPPED | OUTPATIENT
Start: 2024-10-15

## 2024-10-16 RX ORDER — LOSARTAN POTASSIUM 100 MG/1
100 TABLET ORAL DAILY
Qty: 30 TABLET | Refills: 0 | Status: SHIPPED | OUTPATIENT
Start: 2024-10-16

## 2024-10-17 ENCOUNTER — PHARMACY VISIT (OUTPATIENT)
Dept: PHARMACY | Facility: CLINIC | Age: 64
End: 2024-10-17
Payer: MEDICARE

## 2024-11-08 ENCOUNTER — APPOINTMENT (OUTPATIENT)
Dept: NEUROSURGERY | Facility: CLINIC | Age: 64
End: 2024-11-08
Payer: COMMERCIAL

## 2024-11-08 VITALS
WEIGHT: 246.6 LBS | HEIGHT: 70 IN | BODY MASS INDEX: 35.3 KG/M2 | SYSTOLIC BLOOD PRESSURE: 140 MMHG | DIASTOLIC BLOOD PRESSURE: 72 MMHG | TEMPERATURE: 97.1 F | HEART RATE: 82 BPM

## 2024-11-08 DIAGNOSIS — M54.16 LUMBAR RADICULOPATHY: Primary | ICD-10-CM

## 2024-11-08 DIAGNOSIS — M54.14 THORACIC RADICULOPATHY: ICD-10-CM

## 2024-11-08 DIAGNOSIS — M54.9 BACK PAIN, UNSPECIFIED BACK LOCATION, UNSPECIFIED BACK PAIN LATERALITY, UNSPECIFIED CHRONICITY: ICD-10-CM

## 2024-11-08 ASSESSMENT — PATIENT HEALTH QUESTIONNAIRE - PHQ9
1. LITTLE INTEREST OR PLEASURE IN DOING THINGS: NOT AT ALL
2. FEELING DOWN, DEPRESSED OR HOPELESS: NOT AT ALL
2. FEELING DOWN, DEPRESSED OR HOPELESS: NOT AT ALL
1. LITTLE INTEREST OR PLEASURE IN DOING THINGS: NOT AT ALL
SUM OF ALL RESPONSES TO PHQ9 QUESTIONS 1 & 2: 0
SUM OF ALL RESPONSES TO PHQ9 QUESTIONS 1 & 2: 0

## 2024-11-08 ASSESSMENT — LIFESTYLE VARIABLES
SKIP TO QUESTIONS 9-10: 1
HOW OFTEN DO YOU HAVE SIX OR MORE DRINKS ON ONE OCCASION: NEVER
AUDIT-C TOTAL SCORE: 2
HOW OFTEN DO YOU HAVE A DRINK CONTAINING ALCOHOL: 2-4 TIMES A MONTH
HOW MANY STANDARD DRINKS CONTAINING ALCOHOL DO YOU HAVE ON A TYPICAL DAY: 1 OR 2

## 2024-11-08 NOTE — PROGRESS NOTES
[facility] [place]  Department of Neurological Surgery  New Patient Visit    History of Present Illness:  Kevin Olea is a 64 y.o. year old male who presents to the spine clinic with intractable right sided low back pain radiating into the L4-L5 distribution. The pain is excruciating. Hx of left foot drop and numbness through the foot with back pain that required a laminectomy about 8yrs ago. He subsequently saw some improvement in his left sided leg pain but over the past 2-3yrs, has worsening right sided back pain. He is unable to perform ADLs and uses objects to walk with. No recent PT or injections, but tried home exercises and medications. Recently got an MRI and is here to review it.    Prior Spine Surgeries: none    Physical Therapy: home exercises    Diabetic: no   Diabetes Composite Score: 3   Values used to calculate this score:    Points  Metrics       0        Blood Pressure: 140/78       1        Prescribed Statins: Yes       1        Hemoglobin A1c: 5.7%       1        Smokes Tobacco: No       0        Prescribed Aspirin: No      Osteoporosis: no  No DXA results found for the past 12 months    Patient's BMI is There is no height or weight on file to calculate BMI.    Review of Systems:  14/14 systems reviewed and negative other than what is listed in the history of present illness    Patient Active Problem List   Diagnosis    Acute sinusitis    Anxiety    Arthritis of knee, right    Luna's esophagus    Cervical spondylosis without myelopathy    Backache    Chest pain    Hip pain    Leg pain    Lower back pain    Gastroesophageal reflux disease    History of heart disease    Hyperlipidemia    Hypothyroidism    Hypertension    Infected finger    Knee pain, bilateral    Left foot drop    Left knee pain    Low-density-lipoid-type (LDL) hyperlipoproteinemia    Muscle spasm    Nausea in adult    Neck pain    Pre-diabetes    Rash    Right knee pain    Sleep apnea    Spinal stenosis,  lumbar region, with neurogenic claudication    Vitamin D deficiency    Biceps rupture, proximal, left, initial encounter     Past Medical History:   Diagnosis Date    Arthritis     BPH (benign prostatic hyperplasia)     Cataract     GERD (gastroesophageal reflux disease)     Glaucoma     Hyperlipidemia     Hypertension     Joint pain     Personal history of other specified conditions 05/17/2013    History of fatigue    Sleep apnea      Past Surgical History:   Procedure Laterality Date    BACK SURGERY      BICEPS TENDON REPAIR Right 2013    CARPAL TUNNEL RELEASE      EYE SURGERY  07/01/2013    Eye Surgery    KNEE ARTHROPLASTY Right 2022    OTHER SURGICAL HISTORY  07/01/2013    Wrist Surgery     Social History     Tobacco Use    Smoking status: Never     Passive exposure: Never    Smokeless tobacco: Never   Substance Use Topics    Alcohol use: Not Currently     Alcohol/week: 6.0 standard drinks of alcohol     Types: 6 Standard drinks or equivalent per week     family history is not on file.    Current Outpatient Medications:     acetaminophen (Tylenol) 500 mg tablet, Take 1 tablet (500 mg) by mouth every 8 hours if needed for mild pain (1 - 3)., Disp: , Rfl:     aspirin 81 mg EC tablet, Take 1 tablet (81 mg) by mouth once daily., Disp: , Rfl:     cetirizine (ZyrTEC) 10 mg tablet, Take 1 tablet (10 mg) by mouth once daily., Disp: 30 tablet, Rfl: 0     mg tablet, Take 1 tablet (600 mg) by mouth every 6 hours if needed., Disp: , Rfl:     latanoprost (Xalatan) 0.005 % ophthalmic solution, 1 drop once daily at bedtime., Disp: , Rfl:     losartan (Cozaar) 100 mg tablet, Take 1 tablet (100 mg) by mouth once daily., Disp: 30 tablet, Rfl: 0    omeprazole (PriLOSEC) 40 mg DR capsule, TAKE ONE CAPSULE BY MOUTH DAILY (NEED APPOINTMENT - LAST FILL), Disp: 90 capsule, Rfl: 1    pravastatin (Pravachol) 80 mg tablet, Take 1 tablet (80 mg) by mouth once daily., Disp: 90 tablet, Rfl: 1    SEMAGLUTIDE SUBQ, Inject 1 mg under the  skin every 7 days. Patient gets through [facility] pharmacy. Phoned to Dom at the pharmacy 01/16/2024.  (5mg-0.4mg/ml) Give 0.2ml(1mg) subcutaneously once a week., Disp: , Rfl:     tadalafil (Cialis) 5 mg tablet, Take 1 tablet (5 mg) by mouth once daily., Disp: 90 tablet, Rfl: 1    tamsulosin (Flomax) 0.4 mg 24 hr capsule, Take 1 capsule (0.4 mg) by mouth once daily., Disp: 90 capsule, Rfl: 1    tirzepatide (Mounjaro) 7.5 mg/0.5 mL pen injector, Inject 7.5 mg under the skin 1 (one) time per week., Disp: 2 mL, Rfl: 0    traMADol (Ultram) 50 mg tablet, 1 tablet EVERY 6 HOURS (route: oral), Disp: , Rfl:     triamcinolone (Kenalog) 0.1 % cream, Apply topically 2 times a day. Apply to affected area 1-2 times daily as needed., Disp: 15 g, Rfl: 0  No Known Allergies    Physical Examination    General: Well developed, awake/alert/oriented x3, no distress, alert and cooperative  Skin: Warm and dry, no lesions, no rashes  ENMT: Mucous membranes moist, no apparent injury, no lesions seen  Head/Neck: Neck Supple, no apparent injury  Respiratory/Thorax: Normal breath sounds with good chest expansion, thorax symmetric  Cardiovascular: No pitting edema, no JVD    Motor Strength: 5/5 Throughout all extremities except 4/5 strength in left foot    Muscle Bulk: Normal and symmetric in all extremities    Posture:   -- Cervical: Normal  -- Thoracic: Normal  -- Lumbar : Normal  Paraspinal muscle spasm/tenderness absent.     Sensation: intact to light touch    Very subtle left foot drop  L5 radiculopathy left side  Severe back pain along midsection    Results    I personally reviewed and interpreted the imaging results which included MRI showing severe degenerative disc disease with grade 1 spondylolisthesis and severe spinal stenosis from L4-5, L5-S1. CT abdomen/pelvis shows pars defects at L4-5.    Assessment and Plan:    Kevin Olea is a 64 y.o. year old male who presents to the spine clinic with worsening  intractable back pain. Patient has significant structural issue across the 2 bottom disc spaces in his low back. Stress fractures and severe degenerative disc disease and spondylolisthesis present at sites of prior surgery.       I have reviewed imaging and diagnosis with the patient, discussed the natural history of their disease and both non-operative and operative treatments available and rationale vs risks for both.    The patient’s clinical symptoms correlates well with the radiological findings. Patient has been having significant functional impairment with decreased ability to perform her normal activities of daily living. They have tried treatment options including medications (NSAIDs/narcotics/muscle relaxants/membrane stabilizers), formal physical therapy, and injections.    I offered the option of surgery that would consist of a L4-5, L5-S1 anterior lumbar interbody fusion with percutaneous pedicle screw instrumentation L4-S1.    While there is no evidence of instability in the form of pars defect or spondylolisthesis or prior discectomies; because of the presence of foraminal and extraforaminal severe stenosis, extensive decompression with removal of almost the entire facet in the form of complete facetectomy /resection of pars interarticularis or more than 75% of the facet will have to be performed at the operative levels that will result in destabilization of the spine/intraoperative spinal instability and hence would require concomitant stabilization by placement of pedicle screws. I believe that not performing a fusion and instrumentation following the extensive decompression will be a suboptimal treatment and leave the spine destabilized with potential worsening of her symptoms and development of spinal deformity that may require a much bigger revision surgery that currently planned.    I have explained the surgical procedure in detail with expected duration and extent of recovery along risks of  surgery that include, but is not limited to bleeding, infection, blood vessel injury or damage, loss of sensation, loss of bladder, bowel or sexual function, nerve injury/damage resulting in weakness/paralysis, malunion, nonunion, CSF leak, brachial plexus injury, peripheral vision blindness, failure of implants/fusion, failure to relieve symptoms, recurrent disease, adjacent segment disease, need to reoperate for any reason and general anesthesia reaction such as stroke, coma, heart attack, delirium, confusion, death as well as worsening of preexisted medical conditions.    All questions were answered and the patient left satisfied with the surgical plan moving forward.    I have reviewed all prior documentation and reviewed the electronic medical record since admission. I have personally have reviewed all advanced imaging not just the reports and used my interpretation as documented as the relevant findings. I have reviewed the risks and benefits of all treatment recommendations listed in this note with the patient and family.       The above clinical summary has been dictated with voice recognition software. It has not been proofread for grammatical errors, typographical mistakes, or other semantic inconsistencies.    Thank you for visiting our office today. It was our pleasure to take part in your healthcare.     Do not hesitate to call with any questions regarding your plan of care after leaving at [phone] M-F 8am-4pm.     To clinicians, thank you very much for this kind referral. It is a privilege to partner with you in the care of your patients. My office would be delighted to assist you with any further consultations or with questions regarding the plan of care outlined. Do not hesitate to call the office or contact me directly.       Sincerely,      Devon Arita MD, [facility]  Spine , [facility]  Darrell Meek Chair in Spinal  Neurosurgery  Complex Spine Surgery Fellowship Director   of Neurological Surgery  [facility] [employer]  Phone: [phone]  Fax: [phone]        Scribe Attestation  By signing my name below, IMelani Scribe   attest that this documentation has been prepared under the direction and in the presence of Devon Arita MD.

## 2024-11-13 DIAGNOSIS — M54.16 LUMBAR RADICULOPATHY: Primary | ICD-10-CM

## 2024-11-13 DIAGNOSIS — M54.9 BACK PAIN, UNSPECIFIED BACK LOCATION, UNSPECIFIED BACK PAIN LATERALITY, UNSPECIFIED CHRONICITY: ICD-10-CM

## 2024-11-13 RX ORDER — CELECOXIB 400 MG/1
400 CAPSULE ORAL ONCE
OUTPATIENT
Start: 2024-11-13 | End: 2024-11-13

## 2024-11-13 RX ORDER — TRANEXAMIC ACID 650 MG/1
1300 TABLET ORAL ONCE
OUTPATIENT
Start: 2024-11-13 | End: 2024-11-13

## 2024-11-13 RX ORDER — ACETAMINOPHEN 325 MG/1
975 TABLET ORAL ONCE
OUTPATIENT
Start: 2024-11-13 | End: 2024-11-13

## 2024-11-26 ENCOUNTER — HOSPITAL ENCOUNTER (OUTPATIENT)
Dept: RADIOLOGY | Facility: CLINIC | Age: 64
Discharge: HOME | End: 2024-11-26
Payer: COMMERCIAL

## 2024-11-26 ENCOUNTER — EVALUATION (OUTPATIENT)
Dept: PHYSICAL THERAPY | Facility: CLINIC | Age: 64
End: 2024-11-26
Payer: COMMERCIAL

## 2024-11-26 DIAGNOSIS — M54.16 LUMBAR RADICULOPATHY: ICD-10-CM

## 2024-11-26 DIAGNOSIS — M54.16 LUMBAR RADICULOPATHY: Primary | ICD-10-CM

## 2024-11-26 DIAGNOSIS — M54.9 BACK PAIN, UNSPECIFIED BACK LOCATION, UNSPECIFIED BACK PAIN LATERALITY, UNSPECIFIED CHRONICITY: ICD-10-CM

## 2024-11-26 DIAGNOSIS — M48.062 SPINAL STENOSIS, LUMBAR REGION, WITH NEUROGENIC CLAUDICATION: ICD-10-CM

## 2024-11-26 PROCEDURE — 71046 X-RAY EXAM CHEST 2 VIEWS: CPT

## 2024-11-26 PROCEDURE — 72110 X-RAY EXAM L-2 SPINE 4/>VWS: CPT | Performed by: STUDENT IN AN ORGANIZED HEALTH CARE EDUCATION/TRAINING PROGRAM

## 2024-11-26 PROCEDURE — 97161 PT EVAL LOW COMPLEX 20 MIN: CPT | Mod: GP | Performed by: PHYSICAL MEDICINE & REHABILITATION

## 2024-11-26 PROCEDURE — 72120 X-RAY BEND ONLY L-S SPINE: CPT

## 2024-11-26 PROCEDURE — 97110 THERAPEUTIC EXERCISES: CPT | Mod: GP | Performed by: PHYSICAL MEDICINE & REHABILITATION

## 2024-11-26 ASSESSMENT — PATIENT HEALTH QUESTIONNAIRE - PHQ9
2. FEELING DOWN, DEPRESSED OR HOPELESS: NOT AT ALL
1. LITTLE INTEREST OR PLEASURE IN DOING THINGS: NOT AT ALL
SUM OF ALL RESPONSES TO PHQ9 QUESTIONS 1 AND 2: 0

## 2024-11-26 ASSESSMENT — COLUMBIA-SUICIDE SEVERITY RATING SCALE - C-SSRS
6. HAVE YOU EVER DONE ANYTHING, STARTED TO DO ANYTHING, OR PREPARED TO DO ANYTHING TO END YOUR LIFE?: NO
1. IN THE PAST MONTH, HAVE YOU WISHED YOU WERE DEAD OR WISHED YOU COULD GO TO SLEEP AND NOT WAKE UP?: NO
2. HAVE YOU ACTUALLY HAD ANY THOUGHTS OF KILLING YOURSELF?: NO

## 2024-11-26 ASSESSMENT — PAIN - FUNCTIONAL ASSESSMENT: PAIN_FUNCTIONAL_ASSESSMENT: 0-10

## 2024-11-26 ASSESSMENT — PAIN SCALES - GENERAL: PAINLEVEL_OUTOF10: 2

## 2024-11-26 NOTE — PROGRESS NOTES
Physical Therapy  Physical Therapy Orthopedic Evaluation    Patient Name: Kevin Olea  MRN: [id number]  Today's Date: 11/26/2024  Time Calculation  Start Time: 0921  Stop Time: 0959  Time Calculation (min): 38 min  PT Evaluation Time Entry  PT Evaluation (Low) Time Entry: 23  PT Therapeutic Procedures Time Entry  Therapeutic Exercise Time Entry: 12  Therapeutic Activity Time Entry: 3    Insurance:  Number of Treatments Authorized: 1 of 60        Insurance Type: [employer]     Current Problem  Problem List Items Addressed This Visit             ICD-10-CM    Spinal stenosis, lumbar region, with neurogenic claudication M48.062    Lumbar radiculopathy - Primary M54.16       General:  General  Reason for Referral: Low Back Pain with Radiculopathy  Referred By: Devon Arita MD  Past Medical History Relevant to Rehab: Hx of lumbar laminectomy  General Comment: Patient presents with low back pain and radicular symptoms down bilateral lower extremities. Radicular symptoms can reach all the way down to patient's feet. He notes that pain is aggravated with standing and walking, and will improve with sitting. He has a hx of a lumbar laminectomy about 8 years ago, and still deals with drop foot on his LLE.      Precautions:   Precautions  Precautions Comment: None    Medical History Form: Reviewed (scanned into chart)    Subjective:   Subjective     Pain:  Pain Assessment: 0-10  0-10 (Numeric) Pain Score: 2 (Worst: 4/10)  Pain Location: Back  Pain Orientation: Lower  Aggravating Factors:  Standing and Walking  Relieving Factors:  Sitting    Relevant Information (PMH & Previous Tests/Imaging): MRI of Lumbar Spine revealed severe DDD, and severe stenosis from L4-5, L5-S1    Previous Interventions/Treatments: None    Prior Level of Function (PLOF)  Patient previously independent with all ADLs  Exercise/Physical Activity: Physically active, exercised regularly  Work/School: Travels frequently    Patients Living Environment:  "Reviewed and no concern    Primary Language: English    Patient's Goal(s) for Therapy: Acquire strategies for pain management    Red Flags: Do you have any of the following? No  Fever/chills, unexplained weight changes, dizziness/fainting, unexplained change in bowel or bladder functions, unexplained malaise or muscle weakness, night pain/sweats, numbness or tingling    Objective:  Objective     Lumbar AROM  Lumbar flexion: (60°): Fingertips to Mid Shin  Lumbar extension (25°): WNL  Lumbar rotation right (30°): WNL  Lumbar rotation left (30°): WNL  Lumbar sidebend right (25°): WNL  Lumbar sidebend left (25°): WNL  Lumbar Myotomes  R Hip Flex : 4+/5  L Hip Flex (L2): (5/5): 4+/5  R Knee Ext (L3): (5/5): 5/5  L Knee Ext (L3): (5/5) : 5/5  R Ankle DF (L4): (5/5): 5/5  L Ankle DF (L4): (5/5): 4/5  R Great Toe Ext (L5): (5/5) : 4+/5  L Great Toe Ext (L5): (5/5): 4+/5  R Ankle EV (S1): (5/5): 4+/5  L Ankle EV (S1): (5/5): 4+/5  Specific Lower Extremity MMT  R Iliopsoas: (5/5): 4+/5  L Iliopsoas: (5/5): 4+/5  R Hip External Rotation: (5/5): 4+/5  L Hip External Rotation: (5/5): 4+/5  Dermatomes  Dermatomes WFL: yes  Special Tests  Supine SLR: (Negative): Positive on LLE       Outcome Measures:  Other Measures  Oswestry Disablity Index (SWATHI): 40%     Treatment Performed:  Therapeutic Exercise  Therapeutic Exercise Activity 1: Seated Lumbar Flexion Stretch: x10  Therapeutic Exercise Activity 2: Curl Up with Bent Knee: 5x10\" holds each side    Therapeutic Activity  Therapeutic Activity 1: 30 Second Sit to Stand: 22 repetitions    Education: Discussed patient's goals, expectations regarding physical therapy, prognosis, plan of care and home exercise program.   Outpatient Education  Education Comment: Access Code: [email or web address]  URL: [email or web address]  Date: 11/26/2024  Prepared by: Miles Bolden    Exercises  - Seated Lumbar Flexion Stretch  - 3-5 x daily - 7 x weekly - 2-3 sets - 10 reps  - Sit to Stand " with Arms Crossed  - 1 x daily - 7 x weekly - 2-3 sets  - Neutral Curl Up with Straight Leg  - 1-2 x daily - 7 x weekly - 2 sets - 5 reps - 10 second hold  - Kettlebell Suitcase Carry  - 1 x daily - 3-5 x weekly - 3-5 sets    Assessment: Patient presents with signs and symptoms consistent with lumbar radiculopathy, resulting in limited participation in pain-free ADLs and inability to perform at their prior level of function. Pt would benefit from physical therapy to address the impairments found & listed previously in the objective section in order to return to safe and pain-free ADLs and prior level of function.        Clinical Presentation: Stable and/or uncomplicated characteristics    Personal Factors Impacting Care: None    Plan:  PT Plan: Skilled PT  PT Frequency: 1 time per week  Duration: 6 weeks  Onset Date: 01/01/24  Number of Treatments Authorized: 1 of 60  Rehab Potential: Fair  Plan of Care Agreement: Patient  Planned Interventions include: therapeutic exercise, self-care home management, manual therapy, therapeutic activities, gait training, neuromuscular coordination, vasopneumatic, dry needling, aquatic therapy    Goals: Set and Discussed Today  Active       PT Problem       Patient to score 30% or less on Modified SWATHI in order to show improvement in overall quality of life.        Start:  11/26/24    Expected End:  01/07/25            Patient to report ability to walk 1+ miles without increased pain in low back in order to improve overall quality of life.        Start:  11/26/24    Expected End:  01/07/25            Patient to demonstrate independence with HEP in order to establish self management of symptoms.        Start:  11/26/24    Expected End:  01/07/25                Ambulatory Screening Summary      Screening  Frequency  Date Last Completed   Spiritual and Cultural Beliefs   Screening each visit or episode of care 11/26/2024   Falls Risk Screening every ambulatory visit    Pain Screening  annually at primary care visit  12/4/2023   Domestic Violence screening annually at primary care visit 5/21/2024   Depression Screening annually in the primary care setting 11/26/2024   Suicide Risk Screening annually in the primary care setting 11/26/2024   Nutrition and Food Insecurity   Screening at least annually at primary care visit     Key Learner annually in the primary care setting 11/26/2024   Drug Screen  11/8/2024  2:27 PM   Alcohol Screen  11/8/2024  2:27 PM   Advance Directive  5/21/2024       Plan of care was developed with input and agreement by the patient      Miles Bolden, PT

## 2024-11-30 ENCOUNTER — APPOINTMENT (OUTPATIENT)
Dept: PRIMARY CARE | Facility: CLINIC | Age: 64
End: 2024-11-30
Payer: COMMERCIAL

## 2024-12-04 DIAGNOSIS — E11.9 TYPE 2 DIABETES MELLITUS WITHOUT COMPLICATION, UNSPECIFIED WHETHER LONG TERM INSULIN USE (MULTI): ICD-10-CM

## 2024-12-04 RX ORDER — TIRZEPATIDE 7.5 MG/.5ML
7.5 INJECTION, SOLUTION SUBCUTANEOUS WEEKLY
Qty: 2 ML | Refills: 0 | Status: CANCELLED | OUTPATIENT
Start: 2024-12-04

## 2024-12-04 RX ORDER — TIRZEPATIDE 7.5 MG/.5ML
7.5 INJECTION, SOLUTION SUBCUTANEOUS WEEKLY
Qty: 2 ML | Refills: 0 | Status: SHIPPED | OUTPATIENT
Start: 2024-12-04

## 2024-12-30 DIAGNOSIS — E11.9 TYPE 2 DIABETES MELLITUS WITHOUT COMPLICATION, UNSPECIFIED WHETHER LONG TERM INSULIN USE (MULTI): ICD-10-CM

## 2025-01-01 RX ORDER — TIRZEPATIDE 7.5 MG/.5ML
7.5 INJECTION, SOLUTION SUBCUTANEOUS WEEKLY
Qty: 2 ML | Refills: 0 | Status: SHIPPED | OUTPATIENT
Start: 2025-01-01

## 2025-01-10 ENCOUNTER — APPOINTMENT (OUTPATIENT)
Dept: PRIMARY CARE | Facility: CLINIC | Age: 65
End: 2025-01-10
Payer: COMMERCIAL

## 2025-01-17 ENCOUNTER — APPOINTMENT (OUTPATIENT)
Dept: PREADMISSION TESTING | Facility: HOSPITAL | Age: 65
End: 2025-01-17
Payer: COMMERCIAL

## 2025-01-18 ENCOUNTER — APPOINTMENT (OUTPATIENT)
Dept: PRIMARY CARE | Facility: CLINIC | Age: 65
End: 2025-01-18
Payer: COMMERCIAL

## 2025-01-18 VITALS
HEIGHT: 70 IN | BODY MASS INDEX: 34.93 KG/M2 | DIASTOLIC BLOOD PRESSURE: 72 MMHG | SYSTOLIC BLOOD PRESSURE: 128 MMHG | WEIGHT: 244 LBS

## 2025-01-18 DIAGNOSIS — K21.9 GASTROESOPHAGEAL REFLUX DISEASE, UNSPECIFIED WHETHER ESOPHAGITIS PRESENT: Primary | ICD-10-CM

## 2025-01-18 DIAGNOSIS — E03.9 HYPOTHYROIDISM, UNSPECIFIED TYPE: ICD-10-CM

## 2025-01-18 DIAGNOSIS — R97.20 ELEVATED PSA: ICD-10-CM

## 2025-01-18 DIAGNOSIS — F31.9 BIPOLAR AFFECTIVE DISORDER, REMISSION STATUS UNSPECIFIED (MULTI): ICD-10-CM

## 2025-01-18 DIAGNOSIS — J45.20 MILD INTERMITTENT ASTHMA WITHOUT COMPLICATION (HHS-HCC): ICD-10-CM

## 2025-01-18 DIAGNOSIS — I10 PRIMARY HYPERTENSION: ICD-10-CM

## 2025-01-18 DIAGNOSIS — E11.9 TYPE 2 DIABETES MELLITUS WITHOUT COMPLICATION, UNSPECIFIED WHETHER LONG TERM INSULIN USE (MULTI): ICD-10-CM

## 2025-01-18 DIAGNOSIS — E78.5 HYPERLIPIDEMIA, UNSPECIFIED HYPERLIPIDEMIA TYPE: ICD-10-CM

## 2025-01-18 DIAGNOSIS — N40.0 BENIGN PROSTATIC HYPERPLASIA, UNSPECIFIED WHETHER LOWER URINARY TRACT SYMPTOMS PRESENT: ICD-10-CM

## 2025-01-18 PROCEDURE — 4010F ACE/ARB THERAPY RXD/TAKEN: CPT | Performed by: INTERNAL MEDICINE

## 2025-01-18 PROCEDURE — 3074F SYST BP LT 130 MM HG: CPT | Performed by: INTERNAL MEDICINE

## 2025-01-18 PROCEDURE — 3078F DIAST BP <80 MM HG: CPT | Performed by: INTERNAL MEDICINE

## 2025-01-18 PROCEDURE — 3008F BODY MASS INDEX DOCD: CPT | Performed by: INTERNAL MEDICINE

## 2025-01-18 PROCEDURE — 99213 OFFICE O/P EST LOW 20 MIN: CPT | Performed by: INTERNAL MEDICINE

## 2025-01-18 RX ORDER — TIRZEPATIDE 7.5 MG/.5ML
7.5 INJECTION, SOLUTION SUBCUTANEOUS WEEKLY
Qty: 2 ML | Refills: 0 | Status: SHIPPED | OUTPATIENT
Start: 2025-01-18

## 2025-01-18 ASSESSMENT — ENCOUNTER SYMPTOMS
OCCASIONAL FEELINGS OF UNSTEADINESS: 0
DEPRESSION: 0
LOSS OF SENSATION IN FEET: 0

## 2025-01-18 NOTE — PROGRESS NOTES
"Subjective   Patient ID: Kevin Olea is a 64 y.o. male who presents for Follow-up.    Kevin today came here for multiple medical issues.  1. Overall, he is very unhappy, very stressed out because of wife's sudden diagnosis of breast cancer, for which she is under intensive therapy, he is concerned.  2. He also told me end of this month he is having massive back surgery anterior approach that is bothering him.  He is very stressed out.  3. Follow-up on other conditions.  Appetite and weight are okay.  No problem.    I have personally reviewed the patient's Past Medical History, Medications, Allergies, Social History, and Family History in the EMR.    Review of Systems   All other systems reviewed and are negative.    Objective   /72   Ht 1.778 m (5' 10\")   Wt 111 kg (244 lb)   BMI 35.01 kg/m²     Physical Exam  Vitals reviewed.   Cardiovascular:      Heart sounds: Normal heart sounds, S1 normal and S2 normal. No murmur heard.     No friction rub.   Pulmonary:      Effort: Pulmonary effort is normal.      Breath sounds: Normal breath sounds and air entry.   Abdominal:      Palpations: There is no hepatomegaly, splenomegaly or mass.   Musculoskeletal:      Right lower leg: No edema.      Left lower leg: No edema.   Lymphadenopathy:      Lower Body: No right inguinal adenopathy. No left inguinal adenopathy.   Neurological:      Cranial Nerves: Cranial nerves 2-12 are intact.      Sensory: No sensory deficit.      Motor: Motor function is intact.      Deep Tendon Reflexes: Reflexes are normal and symmetric.     LAB WORK:  Laboratory testing discussed.    Assessment/Plan   Problem List Items Addressed This Visit             ICD-10-CM       Cardiac and Vasculature    Hyperlipidemia E78.5    Relevant Orders    Comprehensive Metabolic Panel    Lipid Panel    Hypertension I10    Relevant Orders    CBC    Urinalysis with Reflex Microscopic       Endocrine/Metabolic    Hypothyroidism E03.9    Relevant Orders    " Thyroid Stimulating Hormone       Gastrointestinal and Abdominal    Gastroesophageal reflux disease - Primary K21.9     Other Visit Diagnoses         Codes    Type 2 diabetes mellitus without complication, unspecified whether long term insulin use (Multi)     E11.9    Relevant Medications    tirzepatide (Mounjaro) 7.5 mg/0.5 mL pen injector    Elevated PSA     R97.20    Relevant Orders    Prostate Specific Antigen, Screen    Benign prostatic hyperplasia, unspecified whether lower urinary tract symptoms present     N40.0        1. GERD, on PPI.  2. Hypertension, okay.  3. High cholesterol.  Monitor.  4. BPH, on medication, going for cancer therapy.  5. Weight loss.  Mounjaro helping.  He is going to stop one week before and at least two weeks after surgery, he is back to normal.  6. I will continue to monitor.    Felix Attestation  By signing my name below, IShelly Scribe attest that this documentation has been prepared under the direction and in the presence of Jose Antonio Capellan MD.     All medical record entries made by the javieribe were personally dictated by me I have reviewed the chart and agree the record accurately reflects my personal performance of his history physical examination and management

## 2025-01-19 PROBLEM — J45.20 MILD INTERMITTENT ASTHMA WITHOUT COMPLICATION (HHS-HCC): Status: ACTIVE | Noted: 2025-01-19

## 2025-01-19 PROBLEM — F31.9 BIPOLAR AFFECTIVE DISORDER, REMISSION STATUS UNSPECIFIED (MULTI): Status: ACTIVE | Noted: 2025-01-19

## 2025-01-22 ENCOUNTER — PRE-ADMISSION TESTING (OUTPATIENT)
Dept: PREADMISSION TESTING | Facility: HOSPITAL | Age: 65
End: 2025-01-22
Payer: COMMERCIAL

## 2025-01-22 ENCOUNTER — LAB (OUTPATIENT)
Dept: LAB | Facility: LAB | Age: 65
End: 2025-01-22
Payer: COMMERCIAL

## 2025-01-22 VITALS
OXYGEN SATURATION: 98 % | RESPIRATION RATE: 14 BRPM | HEIGHT: 70 IN | DIASTOLIC BLOOD PRESSURE: 78 MMHG | TEMPERATURE: 97.2 F | WEIGHT: 238.1 LBS | BODY MASS INDEX: 34.09 KG/M2 | SYSTOLIC BLOOD PRESSURE: 154 MMHG | HEART RATE: 73 BPM

## 2025-01-22 DIAGNOSIS — Z01.818 PREOP TESTING: ICD-10-CM

## 2025-01-22 DIAGNOSIS — M54.16 LUMBAR RADICULOPATHY: ICD-10-CM

## 2025-01-22 DIAGNOSIS — E03.9 HYPOTHYROIDISM, UNSPECIFIED TYPE: ICD-10-CM

## 2025-01-22 DIAGNOSIS — I10 PRIMARY HYPERTENSION: ICD-10-CM

## 2025-01-22 DIAGNOSIS — E78.5 HYPERLIPIDEMIA, UNSPECIFIED HYPERLIPIDEMIA TYPE: ICD-10-CM

## 2025-01-22 DIAGNOSIS — R97.20 ELEVATED PSA: ICD-10-CM

## 2025-01-22 DIAGNOSIS — Z12.5 ENCOUNTER FOR PROSTATE CANCER SCREENING: ICD-10-CM

## 2025-01-22 DIAGNOSIS — Z01.818 PREOP TESTING: Primary | ICD-10-CM

## 2025-01-22 DIAGNOSIS — M54.9 BACK PAIN, UNSPECIFIED BACK LOCATION, UNSPECIFIED BACK PAIN LATERALITY, UNSPECIFIED CHRONICITY: ICD-10-CM

## 2025-01-22 LAB
ABO GROUP (TYPE) IN BLOOD: NORMAL
ALBUMIN SERPL BCP-MCNC: 4.7 G/DL (ref 3.4–5)
ALP SERPL-CCNC: 47 U/L (ref 33–136)
ALT SERPL W P-5'-P-CCNC: 42 U/L (ref 10–52)
ANION GAP SERPL CALC-SCNC: 14 MMOL/L (ref 10–20)
ANTIBODY SCREEN: NORMAL
APPEARANCE UR: CLEAR
APTT PPP: 31 SECONDS (ref 27–38)
AST SERPL W P-5'-P-CCNC: 38 U/L (ref 9–39)
ATRIAL RATE: 69 BPM
BASOPHILS # BLD AUTO: 0.04 X10*3/UL (ref 0–0.1)
BASOPHILS NFR BLD AUTO: 0.6 %
BILIRUB SERPL-MCNC: 0.6 MG/DL (ref 0–1.2)
BILIRUB UR STRIP.AUTO-MCNC: NEGATIVE MG/DL
BUN SERPL-MCNC: 11 MG/DL (ref 6–23)
CALCIUM SERPL-MCNC: 9.4 MG/DL (ref 8.6–10.3)
CHLORIDE SERPL-SCNC: 102 MMOL/L (ref 98–107)
CHOLEST SERPL-MCNC: 196 MG/DL (ref 0–199)
CHOLESTEROL/HDL RATIO: 4
CO2 SERPL-SCNC: 27 MMOL/L (ref 21–32)
COLOR UR: COLORLESS
CREAT SERPL-MCNC: 0.93 MG/DL (ref 0.5–1.3)
EGFRCR SERPLBLD CKD-EPI 2021: >90 ML/MIN/1.73M*2
EOSINOPHIL # BLD AUTO: 0.17 X10*3/UL (ref 0–0.7)
EOSINOPHIL NFR BLD AUTO: 2.4 %
ERYTHROCYTE [DISTWIDTH] IN BLOOD BY AUTOMATED COUNT: 11.7 % (ref 11.5–14.5)
EST. AVERAGE GLUCOSE BLD GHB EST-MCNC: 100 MG/DL
GLUCOSE SERPL-MCNC: 95 MG/DL (ref 74–99)
GLUCOSE UR STRIP.AUTO-MCNC: NORMAL MG/DL
HBA1C MFR BLD: 5.1 %
HCT VFR BLD AUTO: 42.7 % (ref 41–52)
HDLC SERPL-MCNC: 49 MG/DL
HGB BLD-MCNC: 14.6 G/DL (ref 13.5–17.5)
IMM GRANULOCYTES # BLD AUTO: 0.04 X10*3/UL (ref 0–0.7)
IMM GRANULOCYTES NFR BLD AUTO: 0.6 % (ref 0–0.9)
INR PPP: 1 (ref 0.9–1.1)
KETONES UR STRIP.AUTO-MCNC: NEGATIVE MG/DL
LDLC SERPL CALC-MCNC: 109 MG/DL
LEUKOCYTE ESTERASE UR QL STRIP.AUTO: NEGATIVE
LYMPHOCYTES # BLD AUTO: 1.45 X10*3/UL (ref 1.2–4.8)
LYMPHOCYTES NFR BLD AUTO: 20.9 %
MCH RBC QN AUTO: 31.5 PG (ref 26–34)
MCHC RBC AUTO-ENTMCNC: 34.2 G/DL (ref 32–36)
MCV RBC AUTO: 92 FL (ref 80–100)
MONOCYTES # BLD AUTO: 0.47 X10*3/UL (ref 0.1–1)
MONOCYTES NFR BLD AUTO: 6.8 %
NEUTROPHILS # BLD AUTO: 4.78 X10*3/UL (ref 1.2–7.7)
NEUTROPHILS NFR BLD AUTO: 68.7 %
NITRITE UR QL STRIP.AUTO: NEGATIVE
NON HDL CHOLESTEROL: 147 MG/DL (ref 0–149)
NRBC BLD-RTO: 0 /100 WBCS (ref 0–0)
P AXIS: 12 DEGREES
P OFFSET: 203 MS
P ONSET: 147 MS
PH UR STRIP.AUTO: 6.5 [PH]
PLATELET # BLD AUTO: 251 X10*3/UL (ref 150–450)
POTASSIUM SERPL-SCNC: 3.9 MMOL/L (ref 3.5–5.3)
PR INTERVAL: 146 MS
PREALB SERPL-MCNC: 34.5 MG/DL (ref 18–40)
PROT SERPL-MCNC: 7.2 G/DL (ref 6.4–8.2)
PROT UR STRIP.AUTO-MCNC: NEGATIVE MG/DL
PROTHROMBIN TIME: 11.6 SECONDS (ref 9.8–12.8)
PSA SERPL-MCNC: 1.42 NG/ML
Q ONSET: 220 MS
QRS COUNT: 12 BEATS
QRS DURATION: 96 MS
QT INTERVAL: 410 MS
QTC CALCULATION(BAZETT): 439 MS
QTC FREDERICIA: 429 MS
R AXIS: -3 DEGREES
RBC # BLD AUTO: 4.63 X10*6/UL (ref 4.5–5.9)
RBC # UR STRIP.AUTO: NEGATIVE /UL
RH FACTOR (ANTIGEN D): NORMAL
SODIUM SERPL-SCNC: 139 MMOL/L (ref 136–145)
SP GR UR STRIP.AUTO: 1.01
T AXIS: 26 DEGREES
T OFFSET: 425 MS
TRIGL SERPL-MCNC: 192 MG/DL (ref 0–149)
TSH SERPL-ACNC: 3.15 MIU/L (ref 0.44–3.98)
UROBILINOGEN UR STRIP.AUTO-MCNC: NORMAL MG/DL
VENTRICULAR RATE: 69 BPM
VLDL: 38 MG/DL (ref 0–40)
WBC # BLD AUTO: 7 X10*3/UL (ref 4.4–11.3)

## 2025-01-22 PROCEDURE — 93005 ELECTROCARDIOGRAM TRACING: CPT

## 2025-01-22 PROCEDURE — 99202 OFFICE O/P NEW SF 15 MIN: CPT | Performed by: NURSE PRACTITIONER

## 2025-01-22 PROCEDURE — 83036 HEMOGLOBIN GLYCOSYLATED A1C: CPT

## 2025-01-22 PROCEDURE — 87081 CULTURE SCREEN ONLY: CPT | Mod: STJLAB

## 2025-01-22 PROCEDURE — 84153 ASSAY OF PSA TOTAL: CPT

## 2025-01-22 PROCEDURE — 84134 ASSAY OF PREALBUMIN: CPT

## 2025-01-22 RX ORDER — CHLORHEXIDINE GLUCONATE ORAL RINSE 1.2 MG/ML
SOLUTION DENTAL
Qty: 473 ML | Refills: 0 | Status: SHIPPED | OUTPATIENT
Start: 2025-01-22

## 2025-01-22 RX ORDER — DIPHENHYDRAMINE CITRATE AND IBUPROFEN 38; 200 MG/1; MG/1
2 TABLET, COATED ORAL NIGHTLY PRN
COMMUNITY
End: 2025-01-31 | Stop reason: HOSPADM

## 2025-01-22 ASSESSMENT — DUKE ACTIVITY SCORE INDEX (DASI)
CAN YOU DO YARD WORK LIKE RAKING LEAVES, WEEDING OR PUSHING A MOWER: YES
CAN YOU PARTICIPATE IN STRENOUS SPORTS LIKE SWIMMING, SINGLES TENNIS, FOOTBALL, BASKETBALL, OR SKIING: NO
CAN YOU DO LIGHT WORK AROUND THE HOUSE LIKE DUSTING OR WASHING DISHES: YES
CAN YOU HAVE SEXUAL RELATIONS: YES
DASI METS SCORE: 7.3
CAN YOU CLIMB A FLIGHT OF STAIRS OR WALK UP A HILL: YES
CAN YOU TAKE CARE OF YOURSELF (EAT, DRESS, BATHE, OR USE TOILET): YES
TOTAL_SCORE: 36.7
CAN YOU DO HEAVY WORK AROUND THE HOUSE LIKE SCRUBBING FLOORS OR LIFTING AND MOVING HEAVY FURNITURE: YES
CAN YOU WALK INDOORS, SUCH AS AROUND YOUR HOUSE: YES
CAN YOU DO MODERATE WORK AROUND THE HOUSE LIKE VACUUMING, SWEEPING FLOORS OR CARRYING GROCERIES: YES
CAN YOU WALK A BLOCK OR TWO ON LEVEL GROUND: YES
CAN YOU RUN A SHORT DISTANCE: NO
CAN YOU PARTICIPATE IN MODERATE RECREATIONAL ACTIVITIES LIKE GOLF, BOWLING, DANCING, DOUBLES TENNIS OR THROWING A BASEBALL OR FOOTBALL: NO

## 2025-01-22 ASSESSMENT — LIFESTYLE VARIABLES: SMOKING_STATUS: NONSMOKER

## 2025-01-22 ASSESSMENT — PAIN SCALES - GENERAL: PAINLEVEL_OUTOF10: 0 - NO PAIN

## 2025-01-22 ASSESSMENT — ACTIVITIES OF DAILY LIVING (ADL): ADL_SCORE: 0

## 2025-01-22 ASSESSMENT — PAIN - FUNCTIONAL ASSESSMENT: PAIN_FUNCTIONAL_ASSESSMENT: 0-10

## 2025-01-22 NOTE — H&P (VIEW-ONLY)
CPM/PAT Evaluation       Name: Kevin Olea (Kevin Olea)  /Age: 1960/64 y.o.     In-Person       Chief Complaint: Back     HPI64 year old male for L4-L5, L5-S1 Anterior Lumbar Interbody Fusion with Screws 25. Patient has had worsening BLE down into feet numbness and tingling over the last 8 years. Left side worse than right. Occasional back pain.      Past Medical History:   Diagnosis Date    Arthritis     BPH (benign prostatic hyperplasia)     Cataract     Cervical disc disorder     GERD (gastroesophageal reflux disease)     Glaucoma     Hyperlipidemia     Hypertension     Joint pain     Low back pain     Personal history of other specified conditions 2013    History of fatigue    Pre-diabetes     Pulmonary arterial hypertension (Multi)     Skin cancer     Sleep apnea     Thoracic disc disorder        Past Surgical History:   Procedure Laterality Date    BACK SURGERY      BICEPS TENDON REPAIR Right 2013    CARPAL TUNNEL RELEASE      RT    EYE SURGERY  2013    Eye Surgery    KNEE ARTHROPLASTY Right     LUMBAR LAMINECTOMY  2018    Left side Lumbar    OTHER SURGICAL HISTORY  2013    Wrist Surgery       Patient Sexual activity questions deferred to the physician.    Family History   Problem Relation Name Age of Onset    Cancer Mother      Heart attack Father      Hypertension Father         No Known Allergies      Prior to Admission medications    Medication Sig Start Date End Date Taking? Authorizing Provider   aspirin 81 mg EC tablet Take 1 tablet (81 mg) by mouth once daily.    Historical Provider, MD   ibuprofen-diphenhydramine cit (Advil PM) 200-38 mg tablet per tablet Take 2 tablets by mouth as needed at bedtime.    Historical Provider, MD   latanoprost (Xalatan) 0.005 % ophthalmic solution Administer 1 drop into the left eye once daily at bedtime.    Historical Provider, MD   losartan (Cozaar) 100 mg tablet TAKE ONE TABLET BY MOUTH EVERY DAY  12/29/24   Jose Antonio Capellan MD   omeprazole (PriLOSEC) 40 mg DR capsule TAKE ONE CAPSULE BY MOUTH DAILY (NEED APPOINTMENT - LAST FILL) 9/5/24   Jose Antonio Capellan MD   pravastatin (Pravachol) 80 mg tablet Take 1 tablet (80 mg) by mouth once daily. 9/5/24   Jose Antonio Capellan MD   tadalafil (Cialis) 5 mg tablet Take 1 tablet (5 mg) by mouth once daily. 9/5/24   Jose Antonio Capellan MD   tamsulosin (Flomax) 0.4 mg 24 hr capsule Take 1 capsule (0.4 mg) by mouth once daily. 9/5/24   Jose Antonio Capellan MD   tirzepatide (Mounjaro) 7.5 mg/0.5 mL pen injector Inject 7.5 mg under the skin 1 (one) time per week. 1/18/25   Jose Antonio Capellan MD   cetirizine (ZyrTEC) 10 mg tablet Take 1 tablet (10 mg) by mouth once daily. 9/24/24 1/18/25  Jose Antonio Capellan MD   tirzepatide (Mounjaro) 7.5 mg/0.5 mL pen injector Inject 7.5 mg under the skin 1 (one) time per week. 1/1/25 1/18/25  Jose Antonio Capellan MD   triamcinolone (Kenalog) 0.1 % cream Apply topically 2 times a day. Apply to affected area 1-2 times daily as needed. 9/24/24 1/18/25  Jose Antonio Capellan MD      Refer to updated medication list on file      Constitutional: Negative for fever, chills, or sweats   ENMT: Negative for nasal discharge, congestion, ear pain, mouth pain, throat pain   Respiratory: Negative for cough, wheezing, shortness of breath   Cardiac: Negative for chest pain, dyspnea on exertion, palpitations   Gastrointestinal: Negative for nausea, vomiting, diarrhea, constipation, abdominal pain  Genitourinary: Negative for dysuria, flank pain, frequency, hematuria   Musculoskeletal: Negative for decreased ROM, swelling, weakness Positive for intermittent back pain, BLE tingling and numbness (left greater than right)   Neurological: Negative for dizziness, confusion, headache  Psychiatric: Negative for mood changes   Skin: Negative for itching, rash, ulcer    Hematologic/Lymph: Negative for bruising, easy bleeding  Allergic/Immunologic: Negative itching, sneezing, swelling    "    Physical Exam  Constitutional:       Appearance: Normal appearance.   HENT:      Head: Normocephalic.   Eyes:      Extraocular Movements: Extraocular movements intact.   Cardiovascular:      Rate and Rhythm: Normal rate and regular rhythm.      Heart sounds: Normal heart sounds.   Pulmonary:      Effort: Pulmonary effort is normal.      Breath sounds: Normal breath sounds.   Abdominal:      General: Bowel sounds are normal.      Palpations: Abdomen is soft.   Musculoskeletal:         General: Normal range of motion.      Cervical back: Normal range of motion.   Skin:     General: Skin is warm and dry.   Neurological:      Mental Status: He is alert and oriented to person, place, and time.   Psychiatric:         Mood and Affect: Mood normal.        PAT AIRWAY:   Airway:     Neck ROM::  Full   Denies missing or loose teeth     Testing/Diagnostic:   Lab Results   Component Value Date    HGBA1C 5.7 (H) 08/31/2024      Patient Specialist/PCP: Timi  Cardiology: Sanchez    Visit Vitals  /78   Pulse 73   Temp 36.2 °C (97.2 °F) (Temporal)   Resp 14   Ht 1.778 m (5' 10\")   Wt 108 kg (238 lb 1.6 oz)   SpO2 98%   BMI 34.16 kg/m²   Smoking Status Never   BSA 2.31 m²       DASI Risk Score      Flowsheet Row Pre-Admission Testing from 1/22/2025 in [facility] [place]   Can you take care of yourself (eat, dress, bathe, or use toilet)?  2.75 filed at 01/22/2025 1012   Can you walk indoors, such as around your house? 1.75 filed at 01/22/2025 1012   Can you walk a block or two on level ground?  2.75 filed at 01/22/2025 1012   Can you climb a flight of stairs or walk up a hill? 5.5 filed at 01/22/2025 1012   Can you run a short distance? 0 filed at 01/22/2025 1012   Can you do light work around the house like dusting or washing dishes? 2.7 filed at 01/22/2025 1012   Can you do moderate work around the house like vacuuming, sweeping floors or carrying groceries? 3.5 filed at 01/22/2025 1012   Can you do heavy work around " the house like scrubbing floors or lifting and moving heavy furniture?  8 filed at 01/22/2025 1012   Can you do yard work like raking leaves, weeding or pushing a mower? 4.5 filed at 01/22/2025 1012   Can you have sexual relations? 5.25 filed at 01/22/2025 1012   Can you participate in moderate recreational activities like golf, bowling, dancing, doubles tennis or throwing a baseball or football? 0 filed at 01/22/2025 1012   Can you participate in strenous sports like swimming, singles tennis, football, basketball, or skiing? 0 filed at 01/22/2025 1012   DASI SCORE 36.7 filed at 01/22/2025 1012   METS Score (Will be calculated only when all the questions are answered) 7.3 filed at 01/22/2025 1012          Caprini DVT Assessment      Flowsheet Row Pre-Admission Testing from 1/22/2025 in [facility] [place]   DVT Score (IF A SCORE IS NOT CALCULATING, MUST SELECT A BMI TO COMPLETE) 10 filed at 01/22/2025 1010   Surgical Factors Major surgery planned, lasting over 3 hours filed at 01/22/2025 1010   BMI (BMI MUST BE CHOSEN) 31-40 (Obesity) filed at 01/22/2025 1010          Modified Frailty Index      Flowsheet Row Pre-Admission Testing from 1/22/2025 in [facility] [place]   Non-independent functional status (problems with dressing, bathing, personal grooming, or cooking) 0 filed at 01/22/2025 1013   History of diabetes mellitus  0 filed at 01/22/2025 1013   History of COPD 0 filed at 01/22/2025 1013   History of CHF No filed at 01/22/2025 1013   History of MI 0 filed at 01/22/2025 1013   History of Percutaneous Coronary Intervention, Cardiac Surgery, or Angina No filed at 01/22/2025 1013   Hypertension requiring the use of medication  0.0909 filed at 01/22/2025 1013   Peripheral vascular disease 0 filed at 01/22/2025 1013   Impaired sensorium (cognitive impairement or loss, clouding, or delirium) 0 filed at 01/22/2025 1013   TIA or CVA withouy residual deficit 0 filed at 01/22/2025 1013   Cerebrovascular  accident with deficit 0 filed at 01/22/2025 1013   Modified Frailty Index Calculator .0909 filed at 01/22/2025 1013          CHADS2 Stroke Risk  Current as of yesterday        N/A 3 to 100%: High Risk   2 to < 3%: Medium Risk   0 to < 2%: Low Risk     Last Change: N/A          This score determines the patient's risk of having a stroke if the patient has atrial fibrillation.        This score is not applicable to this patient. Components are not calculated.          Revised Cardiac Risk Index      Flowsheet Row Pre-Admission Testing from 1/22/2025 in [facility]   High-Risk Surgery (Intraperitoneal, Intrathoracic,Suprainguinal vascular) 0 filed at 01/22/2025 1013   History of ischemic heart disease (History of MI, History of positive exercuse test, Current chest paint considered due to myocardial ischemia, Use of nitrate therapy, ECG with pathological Q Waves) 0 filed at 01/22/2025 1013   History of congestive heart failure (pulmonary edemia, bilateral rales or S3 gallop, Paroxysmal nocturnal dyspnea, CXR showing pulmonary vascular redistribution) 0 filed at 01/22/2025 1013   History of cerebrovascular disease (Prior TIA or stroke) 0 filed at 01/22/2025 1013   Pre-operative insulin treatment 0 filed at 01/22/2025 1013   Pre-operative creatinine>2 mg/dl 0 filed at 01/22/2025 1013   Revised Cardiac Risk Calculator 0 filed at 01/22/2025 1013          Apfel Simplified Score      Flowsheet Row Pre-Admission Testing from 1/22/2025 in [facility]   Smoking status 1 filed at 01/22/2025 1013   History of motion sickness or PONV  0 filed at 01/22/2025 1013   Use of postoperative opioids 1 filed at 01/22/2025 1013   Gender - Female 0=No filed at 01/22/2025 1013   Apfel Simplified Score Calculator 2 filed at 01/22/2025 1013          Risk Analysis Index Results This Encounter         1/22/2025  1013             Do you live in a place other than your own home?: 0    When did you begin living in the  place you are currently residing?: Greater than one year ago    Any kidney failure, kidney not working well, or seeing a kidney doctor (nephrologist)? If yes, was this for kidney stones or another problem?: 0 No    Any history of chronic (long-term) congestive heart failure (CHF)?: 0 No    Any shortness of breath when resting?: 0 No    In the past five years, have you been diagnosed with or treated for cancer?: Yes      SKIN CANCER     During the last 3 months has it become difficult for you to remember things or organize your thoughts?: 0 No    Have you lost weight of 10 pounds or more in the past 3 months without trying?: 0 No    Do you have any loss of appetitie?: 0 No    Getting Around (Mobility): 0 Can get around without help    Eatin Can plan and prepare own meals    Toiletin Can use toilet without any help    Personal Hygiene (Bathing, Hand Washing, Changing Clothes): 0 Can shower or bathe without any help    DELACRUZ Cancer History: Patient indicates history of cancer    Total Risk Analysis Index Score Without Cancer: 21    Total Risk Analysis Index Score: 36          Stop Bang Score      Flowsheet Row Pre-Admission Testing from 2025 in [facility] Admission (Discharged) from 5/10/2024 in [facility] OR with Kota Gabriel MD   Do you snore loudly? 1 filed at 2025 1012 1 filed at 05/10/2024 0908   Do you often feel tired or fatigued after your sleep? 0 filed at 2025 1012 0 filed at 05/10/2024 0908   Has anyone ever observed you stop breathing in your sleep? 1 filed at 2025 1012 1 filed at 05/10/2024 0908   Do you have or are you being treated for high blood pressure? 1 filed at 2025 1012 1 filed at 05/10/2024 0908   Recent BMI (Calculated) 34.2 filed at 2025 1012 36.7 filed at 05/10/2024 0908   Is BMI greater than 35 kg/m2? 0=No filed at 2025 1012 1=Yes filed at 05/10/2024 0908   Age older than 50 years old? 1=Yes filed at  01/22/2025 1012 1=Yes filed at 05/10/2024 0908   Is your neck circumference greater than 17 inches (Male) or 16 inches (Female)? 1 filed at 01/22/2025 1012 1 filed at 05/10/2024 0908   Gender - Male 1=Yes filed at 01/22/2025 1012 1=Yes filed at 05/10/2024 0908   STOP-BANG Total Score 6 filed at 01/22/2025 1012 7 filed at 05/10/2024 0908          Prodigy: High Risk  Total Score: 21              Prodigy Age Score      Prodigy Gender Score     Prodigy SDB Score          ARISCAT Score for Postoperative Pulmonary Complications      Flowsheet Row Pre-Admission Testing [place] [facility]22[facility] [place] [facility]   Age Calculated Score 3 filed at 01/22/2025 1013   Preoperative SpO2 0 filed at 01/22/2025 1013   Respiratory infection in the last month Either upper or lower (i.e., URI, bronchitis, pneumonia), with fever and antibiotic treatment 0 filed at 01/22/2025 1013   Preoperative anemia (Hgb less than 10 g/dl) 0 filed at 01/22/2025 1013   Surgical incision  15 filed at 01/22/2025 1013   Duration of surgery  23 filed at 01/22/2025 1013   Emergency Procedure  0 filed at 01/22/2025 1013   ARISCAT Total Score  41 filed at 01/22/2025 1013          Bardales Perioperative Risk for Myocardial Infarction or Cardiac Arrest (OTIS)      Flowsheet Row Pre-Admission Testing [place] [facility]/22/[facility]25 [place] [facility] [place]   Calculated Age Score 1.24 filed at 01/22/2025 1014   Functional Status  0 filed at 01/22/2025 1014   ASA Class  -3.29 filed at 01/22/2025 1014   Creatinine 0 filed at 01/22/2025 1014   Type of Procedure  0.21 filed at 01/22/2025 1014   OTIS Total Score  -7.09 filed at 01/22/2025 1014   OTIS % 0.08 filed at 01/22/2025 1014          Assessment and Plan:     Preop:   OR with Dr Arita.   Labs ordered per Dr. Arita. HgbAIC today  MRSA swab and oral mouth rinse ordered per protocol   EKG obtained and enclosed. NSR     Neurologic:   Preoperative brain exercise educational handout provided to patient.    Cardiac:  DASI Score:  36.7   MET Score: 7.3  RCRI  0 which is 3.9% 30 day risk of MACE (risk for cardiac death, nonfatal myocardial infarction, and nonfactal cardiac arrest)  OTIS score which indicates a 0.08% risk of intraoperative or 30-day postoperative MACE  Hypertension: compliant with medication  Hyperlipidemia: compliant with Statin    Pulmonary:   STOP-BANG score of  7. High risk of obstructive sleep apnea.   ARISCAT: 41 points which is a intermediate (13.3%) risk of in-hospital post-op pulmonary complications  Sleep apnea: compliant with CPAP     Endocrine:  Pre-Diabetes: Compliant with Mounjaro     GI:  Apfel: 2 points 39% risk for post operative N/V  GERD: compliant with PPI    /Renal:   BPH: compliant with Flomax    Neuro-muscular:   Above    Hematologic:   Caprini score 10, patient at highest risk for perioperative DVT. Patient provided with VTE education/handout.     Skin check: Patient was instructed to make surgeon aware of any skin changes/concerns prior to surgery.     Anesthesia: No history of anesthesia complications. No anesthesia concerns.      *See risk scores as previously documented

## 2025-01-22 NOTE — PREPROCEDURE INSTRUCTIONS
Medication List            Accurate as of January 22, 2025 10:19 AM. Always use your most recent med list.                Advil -38 mg tablet per tablet  Generic drug: ibuprofen-diphenhydramine cit  Additional Medication Adjustments for Surgery: Take last dose 7 days before surgery     aspirin 81 mg EC tablet  Additional Medication Adjustments for Surgery: Take last dose 7 days before surgery     chlorhexidine 0.12 % solution  Commonly known as: Peridex  15 milliliter(s) orally once a day for 2 doses 15 ml  the night before surgery and 15 ml morning of surgery - swish for 30 seconds -DO NOT SWALLOW, SPIT OUT  Medication Adjustments for Surgery: Take/Use as prescribed     latanoprost 0.005 % ophthalmic solution  Commonly known as: Xalatan     losartan 100 mg tablet  Commonly known as: Cozaar  TAKE ONE TABLET BY MOUTH EVERY DAY  Medication Adjustments for Surgery: Take last dose 1 day (24 hours) before surgery     Mounjaro 7.5 mg/0.5 mL pen injector  Generic drug: tirzepatide  Inject 7.5 mg under the skin 1 (one) time per week.  Additional Medication Adjustments for Surgery: Take last dose 7 days before surgery     omeprazole 40 mg DR capsule  Commonly known as: PriLOSEC  TAKE ONE CAPSULE BY MOUTH DAILY (NEED APPOINTMENT - LAST FILL)  Medication Adjustments for Surgery: Take/Use as prescribed     pravastatin 80 mg tablet  Commonly known as: Pravachol  Take 1 tablet (80 mg) by mouth once daily.  Medication Adjustments for Surgery: Take/Use as prescribed     tadalafil 5 mg tablet  Commonly known as: Cialis  Take 1 tablet (5 mg) by mouth once daily.  Medication Adjustments for Surgery: Take last dose 3 days before surgery     tamsulosin 0.4 mg 24 hr capsule  Commonly known as: Flomax  Take 1 capsule (0.4 mg) by mouth once daily.  Medication Adjustments for Surgery: Take/Use as prescribed                PRE-OPERATIVE INSTRUCTIONS    You will receive notification one business day prior to your procedure to confirm  your arrival time. It is important that you answer your phone and/or check your messages during this time. If you do not hear from the surgery center by 5 pm. the day before your procedure, please call [phone].     Please enter the building through the Outpatient entrance and take the elevator off the lobby to the 2nd floor then check in at the Outpatient Surgery desk on the 2nd floor.    INSTRUCTIONS:  Talk to your surgeon for instructions if you should stop your aspirin, blood thinner, or diabetes medicines.  DO NOT take any multivitamins or over the counter supplements for 7-10 days before surgery.  If not being admitted, you must have an adult immediately available to drive you home after surgery. We also highly recommend you have someone stay with you for the entire day and night of your surgery.  For children having surgery, a parent or legal guardian must accompany them to the surgery center. If this is not possible, please call [phone] to make additional arrangements.  For adults who are unable to consent or make medical decisions for themselves, a legal guardian or Power of  must accompany them to the surgery center. If this is not possible, please call [phone] to make additional arrangements.  Wear comfortable, loose fitting clothing.  All jewelry and piercings must be removed. If you are unable to remove an item or have a dermal piercing, please be sure to tell the nurse when you arrive for surgery.  Nail polish and make-up must be removed.  Avoid smoking or consuming alcohol for 24 hours before surgery.  To help prevent infection, please take a shower/bath and wash your hair the night before and/or morning of surgery (or follow other specific bathing instructions provided).    Preoperative Fasting Guidelines    Why must I stop eating and drinking near surgery time?  With sedation, food or liquid in your stomach can enter your lungs causing serious complications  Increases nausea  and vomiting    When do I need to stop eating and drinking before my surgery?  Do not eat any solid food after midnight the night before your surgery/procedure unless otherwise instructed by your surgeon.   You may have up to 13.5 ounces of clear liquid until TWO hours before your instructed arrival time to the hospital.  This includes water, black tea/coffee, (no milk or cream) apple juice, and electrolyte drinks (Gatorade).   You may chew gum until TWO hours before your surgery/procedure      If applicable, notify your surgeons office immediately of any new skin changes that occur to the surgical limb.      If you have any questions or concerns, please call Pre-Admission Testing at [phone].

## 2025-01-22 NOTE — PREPROCEDURE INSTRUCTIONS
Thank you for visiting Preadmission Testing at [facility]. If you have any changes to your health condition, please call the SURGEON's office to alert them and give them details of your symptoms.        Preoperative Brain Exercises    What are brain exercises?  A brain exercise is any activity that engages your thinking (cognitive) skills.    What types of activities are considered brain exercises?  Jigsaw puzzles, crossword puzzles, word jumble, memory games, word search, and many more.  Many can be found free online or on your phone via a mobile asha.    Why should I do brain exercises before my surgery?  More recent research has shown brain exercise before surgery can lower the risk of postoperative delirium (confusion) which can be especially important for older adults.  Patients who did brain exercises for 5 to 10 hours the days before surgery, cut their risk of postoperative delirium in half up to 1 week after surgery.      Preoperative Deep Breathing Exercises    Why it is important to do deep breathing exercises before my surgery?  Deep breathing exercises strengthen your breathing muscles.  This helps you to recover after your surgery and decreases the chance of breathing complications.    How are the deep breathing exercises done?  Sit straight with your back supported.  Breathe in deeply and slowly through your nose. Your lower rib cage should expand and your abdomen may move forward.  Hold that breath for 3 to 5 seconds.  Breathe out through pursed lips, slowly and completely.  Rest and repeat 10 times every hour while awake.  Rest longer if you become dizzy or lightheaded.      Patient and Family Education   Ways You Can Help Prevent Blood Clots     This handout explains some simple things you can do to help prevent blood clots.      Blood clots are blockages that can form in the body's veins. When a blood clot forms in your deep veins, it may be called a deep vein thrombosis, or DVT for short. Blood clots can  happen in any part of the body where blood flows, but they are most common in the arms and legs. If a piece of a blood clot breaks free and travels to the lungs, it is called a pulmonary embolus (PE). A PE can be a very serious problem.      Being in the hospital or having surgery can raise your chances of getting a blood clot because you may not be well enough to move around as much as you normally do.      Ways you can help prevent blood clots in the hospital         Wearing SCDs. SCDs stands for Sequential Compression Devices.   SCDs are special sleeves that wrap around your legs  They attach to a pump that fills them with air to gently squeeze your legs every few minutes.   This helps return the blood in your legs to your heart.   SCDs should only be taken off when walking or bathing.   SCDs may not be comfortable, but they can help save your life.               Wearing compression stockings - if your doctor orders them. These special snug fitting stockings gently squeeze your legs to help blood flow.       Walking. Walking helps move the blood in your legs.   If your doctor says it is ok, try walking the halls at least   5 times a day. Ask us to help you get up, so you don't fall.      Taking any blood thinning medicines your doctor orders.          ©[facility]; 3/23        Ways you can help prevent blood clots at home       Wearing compression stockings - if your doctor orders them. ? Walking - to help move the blood in your legs.       Taking any blood thinning medicines your doctor orders.      Signs of a blood clot or PE      Tell your doctor or nurse know right away if you have of the problems listed below.    If you are at home, seek medical care right away. Call 911 for chest pain or problems breathing.          Signs of a blood clot (DVT) - such as pain,  swelling, redness or warmth in your arm or leg      Signs of a pulmonary embolism (PE) - such as chest     pain or feeling short of breath

## 2025-01-22 NOTE — CPM/PAT H&P
CPM/PAT Evaluation       Name: Kevin Olea (Kevin Olea)  /Age: 1960/64 y.o.     In-Person       Chief Complaint: Back     HPI64 year old male for L4-L5, L5-S1 Anterior Lumbar Interbody Fusion with Screws 25. Patient has had worsening BLE down into feet numbness and tingling over the last 8 years. Left side worse than right. Occasional back pain.      Past Medical History:   Diagnosis Date    Arthritis     BPH (benign prostatic hyperplasia)     Cataract     Cervical disc disorder     GERD (gastroesophageal reflux disease)     Glaucoma     Hyperlipidemia     Hypertension     Joint pain     Low back pain     Personal history of other specified conditions 2013    History of fatigue    Pre-diabetes     Pulmonary arterial hypertension (Multi)     Skin cancer     Sleep apnea     Thoracic disc disorder        Past Surgical History:   Procedure Laterality Date    BACK SURGERY      BICEPS TENDON REPAIR Right 2013    CARPAL TUNNEL RELEASE      RT    EYE SURGERY  2013    Eye Surgery    KNEE ARTHROPLASTY Right     LUMBAR LAMINECTOMY  2018    Left side Lumbar    OTHER SURGICAL HISTORY  2013    Wrist Surgery       Patient Sexual activity questions deferred to the physician.    Family History   Problem Relation Name Age of Onset    Cancer Mother      Heart attack Father      Hypertension Father         No Known Allergies      Prior to Admission medications    Medication Sig Start Date End Date Taking? Authorizing Provider   aspirin 81 mg EC tablet Take 1 tablet (81 mg) by mouth once daily.    Historical Provider, MD   ibuprofen-diphenhydramine cit (Advil PM) 200-38 mg tablet per tablet Take 2 tablets by mouth as needed at bedtime.    Historical Provider, MD   latanoprost (Xalatan) 0.005 % ophthalmic solution Administer 1 drop into the left eye once daily at bedtime.    Historical Provider, MD   losartan (Cozaar) 100 mg tablet TAKE ONE TABLET BY MOUTH EVERY DAY  12/29/24   Jose Antonio Capellan MD   omeprazole (PriLOSEC) 40 mg DR capsule TAKE ONE CAPSULE BY MOUTH DAILY (NEED APPOINTMENT - LAST FILL) 9/5/24   Jose Antonio Capellan MD   pravastatin (Pravachol) 80 mg tablet Take 1 tablet (80 mg) by mouth once daily. 9/5/24   Jose Antonio Capellan MD   tadalafil (Cialis) 5 mg tablet Take 1 tablet (5 mg) by mouth once daily. 9/5/24   Jose Antonio Capellan MD   tamsulosin (Flomax) 0.4 mg 24 hr capsule Take 1 capsule (0.4 mg) by mouth once daily. 9/5/24   Jose Antonio Capellan MD   tirzepatide (Mounjaro) 7.5 mg/0.5 mL pen injector Inject 7.5 mg under the skin 1 (one) time per week. 1/18/25   Jose Antonio Capellan MD   cetirizine (ZyrTEC) 10 mg tablet Take 1 tablet (10 mg) by mouth once daily. 9/24/24 1/18/25  Jose Antonio Capellan MD   tirzepatide (Mounjaro) 7.5 mg/0.5 mL pen injector Inject 7.5 mg under the skin 1 (one) time per week. 1/1/25 1/18/25  Jose Antonio Capellan MD   triamcinolone (Kenalog) 0.1 % cream Apply topically 2 times a day. Apply to affected area 1-2 times daily as needed. 9/24/24 1/18/25  Jose Antonio Capellan MD      Refer to updated medication list on file      Constitutional: Negative for fever, chills, or sweats   ENMT: Negative for nasal discharge, congestion, ear pain, mouth pain, throat pain   Respiratory: Negative for cough, wheezing, shortness of breath   Cardiac: Negative for chest pain, dyspnea on exertion, palpitations   Gastrointestinal: Negative for nausea, vomiting, diarrhea, constipation, abdominal pain  Genitourinary: Negative for dysuria, flank pain, frequency, hematuria   Musculoskeletal: Negative for decreased ROM, swelling, weakness Positive for intermittent back pain, BLE tingling and numbness (left greater than right)   Neurological: Negative for dizziness, confusion, headache  Psychiatric: Negative for mood changes   Skin: Negative for itching, rash, ulcer    Hematologic/Lymph: Negative for bruising, easy bleeding  Allergic/Immunologic: Negative itching, sneezing, swelling    "    Physical Exam  Constitutional:       Appearance: Normal appearance.   HENT:      Head: Normocephalic.   Eyes:      Extraocular Movements: Extraocular movements intact.   Cardiovascular:      Rate and Rhythm: Normal rate and regular rhythm.      Heart sounds: Normal heart sounds.   Pulmonary:      Effort: Pulmonary effort is normal.      Breath sounds: Normal breath sounds.   Abdominal:      General: Bowel sounds are normal.      Palpations: Abdomen is soft.   Musculoskeletal:         General: Normal range of motion.      Cervical back: Normal range of motion.   Skin:     General: Skin is warm and dry.   Neurological:      Mental Status: He is alert and oriented to person, place, and time.   Psychiatric:         Mood and Affect: Mood normal.        PAT AIRWAY:   Airway:     Neck ROM::  Full   Denies missing or loose teeth     Testing/Diagnostic:   Lab Results   Component Value Date    HGBA1C 5.7 (H) 08/31/2024      Patient Specialist/PCP: Timi  Cardiology: Sanchez    Visit Vitals  /78   Pulse 73   Temp 36.2 °C (97.2 °F) (Temporal)   Resp 14   Ht 1.778 m (5' 10\")   Wt 108 kg (238 lb 1.6 oz)   SpO2 98%   BMI 34.16 kg/m²   Smoking Status Never   BSA 2.31 m²       DASI Risk Score      [place] [facility] [place] you take care of yourself (eat, dress, bathe, or use toilet)?  2.75 filed at 01/22/2025 1012   Can you walk indoors, such as around your house? 1.75 filed at 01/22/2025 1012   Can you walk a block or two on level ground?  2.75 filed at 01/22/2025 1012   Can you climb a flight of stairs or walk up a hill? 5.5 filed at 01/22/2025 1012   Can you run a short distance? 0 filed at 01/22/2025 1012   Can you do light work around the house like dusting or washing dishes? 2.7 filed at 01/22/2025 1012   Can you do moderate work around the house like vacuuming, sweeping floors or carrying groceries? 3.5 filed at 01/22/2025 1012   Can you do heavy work around " the house like scrubbing floors or lifting and moving heavy furniture?  8 filed at 01/22/2025 1012   Can you do yard work like raking leaves, weeding or pushing a mower? 4.5 filed at 01/22/2025 1012   Can you have sexual relations? 5.25 filed at 01/22/2025 1012   Can you participate in moderate recreational activities like golf, bowling, dancing, doubles tennis or throwing a baseball or football? 0 filed at 01/22/2025 1012   Can you participate in strenous sports like swimming, singles tennis, football, basketball, or skiing? 0 filed at 01/22/2025 1012   DASI SCORE 36.7 filed at 01/22/2025 1012   METS Score (Will be calculated only when all the questions are answered) 7.3 filed at 01/22/2025 1012          Caprini DVT Assessment      Flowsheet Row Pre-Admission Testing from 1/22/2025 in [facility]   DVT Score (IF A SCORE IS NOT CALCULATING, MUST SELECT A BMI TO COMPLETE) 10 filed at 01/22/2025 1010   Surgical Factors Major surgery planned, lasting over 3 hours filed at 01/22/2025 1010   BMI (BMI MUST BE CHOSEN) 31-40 (Obesity) filed at 01/22/2025 1010          Modified Frailty Index      Flowsheet Row Pre-Admission Testing from 1/22/2025 in [facility]   Non-independent functional status (problems with dressing, bathing, personal grooming, or cooking) 0 filed at 01/22/2025 1013   History of diabetes mellitus  0 filed at 01/22/2025 1013   History of COPD 0 filed at 01/22/2025 1013   History of CHF No filed at 01/22/2025 1013   History of MI 0 filed at 01/22/2025 1013   History of Percutaneous Coronary Intervention, Cardiac Surgery, or Angina No filed at 01/22/2025 1013   Hypertension requiring the use of medication  0.0909 filed at 01/22/2025 1013   Peripheral vascular disease 0 filed at 01/22/2025 1013   Impaired sensorium (cognitive impairement or loss, clouding, or delirium) 0 filed at 01/22/2025 1013   TIA or CVA withouy residual deficit 0 filed at 01/22/2025 1013   Cerebrovascular  accident with deficit 0 filed at 01/22/2025 1013   Modified Frailty Index Calculator .0909 filed at 01/22/2025 1013          CHADS2 Stroke Risk  Current as of yesterday        N/A 3 to 100%: High Risk   2 to < 3%: Medium Risk   0 to < 2%: Low Risk     Last Change: N/A          This score determines the patient's risk of having a stroke if the patient has atrial fibrillation.        This score is not applicable to this patient. Components are not calculated.          Revised Cardiac Risk Index      Flowsheet Row [place] [facility] [place]   High-Risk Surgery (Intraperitoneal, Intrathoracic,Suprainguinal vascular) 0 filed at 01/22/2025 1013   History of ischemic heart disease (History of MI, History of positive exercuse test, Current chest paint considered due to myocardial ischemia, Use of nitrate therapy, ECG with pathological Q Waves) 0 filed at 01/22/2025 1013   History of congestive heart failure (pulmonary edemia, bilateral rales or S3 gallop, Paroxysmal nocturnal dyspnea, CXR showing pulmonary vascular redistribution) 0 filed at 01/22/2025 1013   History of cerebrovascular disease (Prior TIA or stroke) 0 filed at 01/22/2025 1013   Pre-operative insulin treatment 0 filed at 01/22/2025 1013   Pre-operative creatinine>2 mg/dl 0 filed at 01/22/2025 1013   Revised Cardiac Risk Calculator 0 filed at 01/22/2025 1013          Apfel Simplified Score      Flowsheet Row [place] [facility] [place]   Smoking status 1 filed at 01/22/2025 1013   History of motion sickness or PONV  0 filed at 01/22/2025 1013   Use of postoperative opioids 1 filed at 01/22/2025 1013   Gender - Female 0=No filed at 01/22/2025 1013   Apfel Simplified Score Calculator 2 filed at 01/22/2025 1013          Risk Analysis Index Results This Encounter         1/22/2025  1013             Do you live in a place other than your own home?: 0    When did you begin living in the  place you are currently residing?: Greater than one year ago    Any kidney failure, kidney not working well, or seeing a kidney doctor (nephrologist)? If yes, was this for kidney stones or another problem?: 0 No    Any history of chronic (long-term) congestive heart failure (CHF)?: 0 No    Any shortness of breath when resting?: 0 No    In the past five years, have you been diagnosed with or treated for cancer?: Yes      SKIN CANCER     During the last 3 months has it become difficult for you to remember things or organize your thoughts?: 0 No    Have you lost weight of 10 pounds or more in the past 3 months without trying?: 0 No    Do you have any loss of appetitie?: 0 No    Getting Around (Mobility): 0 Can get around without help    Eatin Can plan and prepare own meals    Toiletin Can use toilet without any help    Personal Hygiene (Bathing, Hand Washing, Changing Clothes): 0 Can shower or bathe without any help    DELACRUZ Cancer History: Patient indicates history of cancer    Total Risk Analysis Index Score Without Cancer: 21    Total Risk Analysis Index Score: 36          Stop Bang Score      Flowsheet Row Pre-Admission Testing [place] [facility] [place]) from 5/10/2024 in [facility] OR with Kota Gabriel MD   Do you snore loudly? 1 filed at 2025 1012 1 filed at 05/10/2024 0908   Do you often feel tired or fatigued after your sleep? 0 filed at 2025 1012 0 filed at 05/10/2024 0908   Has anyone ever observed you stop breathing in your sleep? 1 filed at 2025 1012 1 filed at 05/10/2024 0908   Do you have or are you being treated for high blood pressure? 1 filed at 2025 1012 1 filed at 05/10/2024 0908   Recent BMI (Calculated) 34.2 filed at 2025 1012 36.7 filed at 05/10/2024 0908   Is BMI greater than 35 kg/m2? 0=No filed at 2025 1012 1=Yes filed at 05/10/2024 0908   Age older than 50 years old? 1=Yes filed at  01/22/2025 1012 1=Yes filed at 05/10/2024 0908   Is your neck circumference greater than 17 inches (Male) or 16 inches (Female)? 1 filed at 01/22/2025 1012 1 filed at 05/10/2024 0908   Gender - Male 1=Yes filed at 01/22/2025 1012 1=Yes filed at 05/10/2024 0908   STOP-BANG Total Score 6 filed at 01/22/2025 1012 7 filed at 05/10/2024 0908          Prodigy: High Risk  Total Score: 21              Prodigy Age Score      Prodigy Gender Score     Prodigy SDB Score          ARISCAT Score for Postoperative Pulmonary Complications      Flowsheet Row [place] [facility] [place]   Age Calculated Score 3 filed at 01/22/2025 1013   Preoperative SpO2 0 filed at 01/22/2025 1013   Respiratory infection in the last month Either upper or lower (i.e., URI, bronchitis, pneumonia), with fever and antibiotic treatment 0 filed at 01/22/2025 1013   Preoperative anemia (Hgb less than 10 g/dl) 0 filed at 01/22/2025 1013   Surgical incision  15 filed at 01/22/2025 1013   Duration of surgery  23 filed at 01/22/2025 1013   Emergency Procedure  0 filed at 01/22/2025 1013   ARISCAT Total Score  41 filed at 01/22/2025 1013          Bardales Perioperative Risk for Myocardial Infarction or Cardiac Arrest (OTIS)      Flowsheet Row Pre-Admission Testing [place]22/[place] [facility] [place]   Calculated Age Score 1.24 filed at 01/22/2025 1014   Functional Status  0 filed at 01/22/2025 1014   ASA Class  -3.29 filed at 01/22/2025 1014   Creatinine 0 filed at 01/22/2025 1014   Type of Procedure  0.21 filed at 01/22/2025 1014   OTIS Total Score  -7.09 filed at 01/22/2025 1014   OTIS % 0.08 filed at 01/22/2025 1014          Assessment and Plan:     Preop:   OR with Dr Arita.   Labs ordered per Dr. Arita. HgbAIC today  MRSA swab and oral mouth rinse ordered per protocol   EKG obtained and enclosed. NSR     Neurologic:   Preoperative brain exercise educational handout provided to patient.    Cardiac:  DASI Score:  36.7   MET Score: 7.3  RCRI  0 which is 3.9% 30 day risk of MACE (risk for cardiac death, nonfatal myocardial infarction, and nonfactal cardiac arrest)  OTIS score which indicates a 0.08% risk of intraoperative or 30-day postoperative MACE  Hypertension: compliant with medication  Hyperlipidemia: compliant with Statin    Pulmonary:   STOP-BANG score of  7. High risk of obstructive sleep apnea.   ARISCAT: 41 points which is a intermediate (13.3%) risk of in-hospital post-op pulmonary complications  Sleep apnea: compliant with CPAP     Endocrine:  Pre-Diabetes: Compliant with Mounjaro     GI:  Apfel: 2 points 39% risk for post operative N/V  GERD: compliant with PPI    /Renal:   BPH: compliant with Flomax    Neuro-muscular:   Above    Hematologic:   Caprini score 10, patient at highest risk for perioperative DVT. Patient provided with VTE education/handout.     Skin check: Patient was instructed to make surgeon aware of any skin changes/concerns prior to surgery.     Anesthesia: No history of anesthesia complications. No anesthesia concerns.      *See risk scores as previously documented

## 2025-01-24 LAB — STAPHYLOCOCCUS SPEC CULT: NORMAL

## 2025-01-29 PROBLEM — M54.16 LUMBAR RADICULOPATHY, CHRONIC: Status: ACTIVE | Noted: 2025-01-29

## 2025-01-30 ENCOUNTER — APPOINTMENT (OUTPATIENT)
Dept: RADIOLOGY | Facility: HOSPITAL | Age: 65
End: 2025-01-30
Payer: COMMERCIAL

## 2025-01-30 ENCOUNTER — ANESTHESIA EVENT (OUTPATIENT)
Dept: OPERATING ROOM | Facility: HOSPITAL | Age: 65
End: 2025-01-30
Payer: COMMERCIAL

## 2025-01-30 ENCOUNTER — ANESTHESIA (OUTPATIENT)
Dept: OPERATING ROOM | Facility: HOSPITAL | Age: 65
End: 2025-01-30
Payer: COMMERCIAL

## 2025-01-30 ENCOUNTER — HOSPITAL ENCOUNTER (INPATIENT)
Facility: HOSPITAL | Age: 65
LOS: 1 days | Discharge: HOME | End: 2025-01-31
Attending: STUDENT IN AN ORGANIZED HEALTH CARE EDUCATION/TRAINING PROGRAM | Admitting: STUDENT IN AN ORGANIZED HEALTH CARE EDUCATION/TRAINING PROGRAM
Payer: COMMERCIAL

## 2025-01-30 DIAGNOSIS — M54.9 BACK PAIN, UNSPECIFIED BACK LOCATION, UNSPECIFIED BACK PAIN LATERALITY, UNSPECIFIED CHRONICITY: ICD-10-CM

## 2025-01-30 DIAGNOSIS — G89.18 POST-OPERATIVE PAIN: ICD-10-CM

## 2025-01-30 DIAGNOSIS — E11.9 TYPE 2 DIABETES MELLITUS WITHOUT COMPLICATION, UNSPECIFIED WHETHER LONG TERM INSULIN USE (MULTI): ICD-10-CM

## 2025-01-30 DIAGNOSIS — M54.16 LUMBAR RADICULOPATHY, CHRONIC: ICD-10-CM

## 2025-01-30 DIAGNOSIS — Z86.79 HISTORY OF HEART DISEASE: ICD-10-CM

## 2025-01-30 DIAGNOSIS — M54.16 LUMBAR RADICULOPATHY: Primary | ICD-10-CM

## 2025-01-30 LAB — GLUCOSE BLD MANUAL STRIP-MCNC: 198 MG/DL (ref 74–99)

## 2025-01-30 PROCEDURE — 22558 ARTHRD ANT NTRBD MIN DSC LUM: CPT | Performed by: STUDENT IN AN ORGANIZED HEALTH CARE EDUCATION/TRAINING PROGRAM

## 2025-01-30 PROCEDURE — 7100000001 HC RECOVERY ROOM TIME - INITIAL BASE CHARGE: Performed by: STUDENT IN AN ORGANIZED HEALTH CARE EDUCATION/TRAINING PROGRAM

## 2025-01-30 PROCEDURE — 22853 INSJ BIOMECHANICAL DEVICE: CPT | Performed by: STUDENT IN AN ORGANIZED HEALTH CARE EDUCATION/TRAINING PROGRAM

## 2025-01-30 PROCEDURE — P9045 ALBUMIN (HUMAN), 5%, 250 ML: HCPCS | Mod: JZ | Performed by: ANESTHESIOLOGIST ASSISTANT

## 2025-01-30 PROCEDURE — 3700000002 HC GENERAL ANESTHESIA TIME - EACH INCREMENTAL 1 MINUTE: Performed by: STUDENT IN AN ORGANIZED HEALTH CARE EDUCATION/TRAINING PROGRAM

## 2025-01-30 PROCEDURE — 2500000005 HC RX 250 GENERAL PHARMACY W/O HCPCS: Performed by: ANESTHESIOLOGIST ASSISTANT

## 2025-01-30 PROCEDURE — 2500000004 HC RX 250 GENERAL PHARMACY W/ HCPCS (ALT 636 FOR OP/ED): Performed by: ANESTHESIOLOGY

## 2025-01-30 PROCEDURE — 0ST40ZZ RESECTION OF LUMBOSACRAL DISC, OPEN APPROACH: ICD-10-PCS | Performed by: STUDENT IN AN ORGANIZED HEALTH CARE EDUCATION/TRAINING PROGRAM

## 2025-01-30 PROCEDURE — A22558 PR ARTHRODESIS ANT INTERBODY MIN DISCECTOMY,LUMBAR: Performed by: ANESTHESIOLOGIST ASSISTANT

## 2025-01-30 PROCEDURE — 22842 INSERT SPINE FIXATION DEVICE: CPT | Performed by: STUDENT IN AN ORGANIZED HEALTH CARE EDUCATION/TRAINING PROGRAM

## 2025-01-30 PROCEDURE — 22585 ARTHRD ANT NTRBD MIN DSC EA: CPT | Performed by: SURGERY

## 2025-01-30 PROCEDURE — 22558 ARTHRD ANT NTRBD MIN DSC LUM: CPT | Performed by: SURGERY

## 2025-01-30 PROCEDURE — 1100000001 HC PRIVATE ROOM DAILY

## 2025-01-30 PROCEDURE — 2500000002 HC RX 250 W HCPCS SELF ADMINISTERED DRUGS (ALT 637 FOR MEDICARE OP, ALT 636 FOR OP/ED): Performed by: STUDENT IN AN ORGANIZED HEALTH CARE EDUCATION/TRAINING PROGRAM

## 2025-01-30 PROCEDURE — 2500000002 HC RX 250 W HCPCS SELF ADMINISTERED DRUGS (ALT 637 FOR MEDICARE OP, ALT 636 FOR OP/ED): Performed by: ANESTHESIOLOGIST ASSISTANT

## 2025-01-30 PROCEDURE — 2500000005 HC RX 250 GENERAL PHARMACY W/O HCPCS: Performed by: STUDENT IN AN ORGANIZED HEALTH CARE EDUCATION/TRAINING PROGRAM

## 2025-01-30 PROCEDURE — S0109 METHADONE ORAL 5MG: HCPCS | Performed by: ANESTHESIOLOGIST ASSISTANT

## 2025-01-30 PROCEDURE — 7100000002 HC RECOVERY ROOM TIME - EACH INCREMENTAL 1 MINUTE: Performed by: STUDENT IN AN ORGANIZED HEALTH CARE EDUCATION/TRAINING PROGRAM

## 2025-01-30 PROCEDURE — 2780000003 HC OR 278 NO HCPCS: Performed by: STUDENT IN AN ORGANIZED HEALTH CARE EDUCATION/TRAINING PROGRAM

## 2025-01-30 PROCEDURE — 2500000004 HC RX 250 GENERAL PHARMACY W/ HCPCS (ALT 636 FOR OP/ED): Performed by: STUDENT IN AN ORGANIZED HEALTH CARE EDUCATION/TRAINING PROGRAM

## 2025-01-30 PROCEDURE — 82947 ASSAY GLUCOSE BLOOD QUANT: CPT

## 2025-01-30 PROCEDURE — C9359 IMPLNT,BON VOID FILLER-PUTTY: HCPCS | Performed by: STUDENT IN AN ORGANIZED HEALTH CARE EDUCATION/TRAINING PROGRAM

## 2025-01-30 PROCEDURE — 22585 ARTHRD ANT NTRBD MIN DSC EA: CPT | Performed by: STUDENT IN AN ORGANIZED HEALTH CARE EDUCATION/TRAINING PROGRAM

## 2025-01-30 PROCEDURE — 0SG00A0 FUSION OF LUMBAR VERTEBRAL JOINT WITH INTERBODY FUSION DEVICE, ANTERIOR APPROACH, ANTERIOR COLUMN, OPEN APPROACH: ICD-10-PCS | Performed by: STUDENT IN AN ORGANIZED HEALTH CARE EDUCATION/TRAINING PROGRAM

## 2025-01-30 PROCEDURE — 3600000018 HC OR TIME - INITIAL BASE CHARGE - PROCEDURE LEVEL SIX: Performed by: STUDENT IN AN ORGANIZED HEALTH CARE EDUCATION/TRAINING PROGRAM

## 2025-01-30 PROCEDURE — 2500000004 HC RX 250 GENERAL PHARMACY W/ HCPCS (ALT 636 FOR OP/ED): Mod: JZ | Performed by: ANESTHESIOLOGIST ASSISTANT

## 2025-01-30 PROCEDURE — A22558 PR ARTHRODESIS ANT INTERBODY MIN DISCECTOMY,LUMBAR: Performed by: ANESTHESIOLOGY

## 2025-01-30 PROCEDURE — 2500000001 HC RX 250 WO HCPCS SELF ADMINISTERED DRUGS (ALT 637 FOR MEDICARE OP): Performed by: STUDENT IN AN ORGANIZED HEALTH CARE EDUCATION/TRAINING PROGRAM

## 2025-01-30 PROCEDURE — 61783 SCAN PROC SPINAL: CPT | Performed by: STUDENT IN AN ORGANIZED HEALTH CARE EDUCATION/TRAINING PROGRAM

## 2025-01-30 PROCEDURE — 3700000001 HC GENERAL ANESTHESIA TIME - INITIAL BASE CHARGE: Performed by: STUDENT IN AN ORGANIZED HEALTH CARE EDUCATION/TRAINING PROGRAM

## 2025-01-30 PROCEDURE — C1889 IMPLANT/INSERT DEVICE, NOC: HCPCS | Performed by: STUDENT IN AN ORGANIZED HEALTH CARE EDUCATION/TRAINING PROGRAM

## 2025-01-30 PROCEDURE — 2720000007 HC OR 272 NO HCPCS: Performed by: STUDENT IN AN ORGANIZED HEALTH CARE EDUCATION/TRAINING PROGRAM

## 2025-01-30 PROCEDURE — 00NY0ZZ RELEASE LUMBAR SPINAL CORD, OPEN APPROACH: ICD-10-PCS | Performed by: STUDENT IN AN ORGANIZED HEALTH CARE EDUCATION/TRAINING PROGRAM

## 2025-01-30 PROCEDURE — 0ST20ZZ RESECTION OF LUMBAR VERTEBRAL DISC, OPEN APPROACH: ICD-10-PCS | Performed by: STUDENT IN AN ORGANIZED HEALTH CARE EDUCATION/TRAINING PROGRAM

## 2025-01-30 PROCEDURE — 3600000017 HC OR TIME - EACH INCREMENTAL 1 MINUTE - PROCEDURE LEVEL SIX: Performed by: STUDENT IN AN ORGANIZED HEALTH CARE EDUCATION/TRAINING PROGRAM

## 2025-01-30 PROCEDURE — 01NB0ZZ RELEASE LUMBAR NERVE, OPEN APPROACH: ICD-10-PCS | Performed by: STUDENT IN AN ORGANIZED HEALTH CARE EDUCATION/TRAINING PROGRAM

## 2025-01-30 PROCEDURE — C1713 ANCHOR/SCREW BN/BN,TIS/BN: HCPCS | Performed by: STUDENT IN AN ORGANIZED HEALTH CARE EDUCATION/TRAINING PROGRAM

## 2025-01-30 PROCEDURE — 0SG30A0 FUSION OF LUMBOSACRAL JOINT WITH INTERBODY FUSION DEVICE, ANTERIOR APPROACH, ANTERIOR COLUMN, OPEN APPROACH: ICD-10-PCS | Performed by: STUDENT IN AN ORGANIZED HEALTH CARE EDUCATION/TRAINING PROGRAM

## 2025-01-30 PROCEDURE — 36620 INSERTION CATHETER ARTERY: CPT | Performed by: ANESTHESIOLOGY

## 2025-01-30 DEVICE — SCREW, RELINE MAS RED, 7.5X50MM POLY 2C: Type: IMPLANTABLE DEVICE | Site: SPINE LUMBAR | Status: FUNCTIONAL

## 2025-01-30 DEVICE — PROPEL PUTTY, LARGE
Type: IMPLANTABLE DEVICE | Site: SPINE LUMBAR | Status: FUNCTIONAL
Brand: PROPEL

## 2025-01-30 DEVICE — SCREW, RELINE MAS RED, 7.5X45MM POLY 2C: Type: IMPLANTABLE DEVICE | Site: SPINE LUMBAR | Status: FUNCTIONAL

## 2025-01-30 DEVICE — SCREW, RELINE LOCK, 5.5MM OPEN TULIP: Type: IMPLANTABLE DEVICE | Site: SPINE LUMBAR | Status: FUNCTIONAL

## 2025-01-30 DEVICE — PIN, 9733235, 100MM, STERILE, PERC REF: Type: IMPLANTABLE DEVICE | Site: SPINE LUMBAR | Status: NON-FUNCTIONAL

## 2025-01-30 DEVICE — IMPLANTABLE DEVICE: Type: IMPLANTABLE DEVICE | Site: SPINE LUMBAR | Status: FUNCTIONAL

## 2025-01-30 RX ORDER — CELECOXIB 200 MG/1
400 CAPSULE ORAL ONCE
Status: COMPLETED | OUTPATIENT
Start: 2025-01-30 | End: 2025-01-30

## 2025-01-30 RX ORDER — HYDROMORPHONE HYDROCHLORIDE 1 MG/ML
INJECTION, SOLUTION INTRAMUSCULAR; INTRAVENOUS; SUBCUTANEOUS AS NEEDED
Status: DISCONTINUED | OUTPATIENT
Start: 2025-01-30 | End: 2025-01-30

## 2025-01-30 RX ORDER — CEFAZOLIN SODIUM 2 G/100ML
INJECTION, SOLUTION INTRAVENOUS AS NEEDED
Status: DISCONTINUED | OUTPATIENT
Start: 2025-01-30 | End: 2025-01-30

## 2025-01-30 RX ORDER — LIDOCAINE HYDROCHLORIDE AND EPINEPHRINE 10; 10 UG/ML; MG/ML
INJECTION, SOLUTION INFILTRATION; PERINEURAL AS NEEDED
Status: DISCONTINUED | OUTPATIENT
Start: 2025-01-30 | End: 2025-01-30 | Stop reason: HOSPADM

## 2025-01-30 RX ORDER — HYDROMORPHONE HYDROCHLORIDE 1 MG/ML
1 INJECTION, SOLUTION INTRAMUSCULAR; INTRAVENOUS; SUBCUTANEOUS EVERY 5 MIN PRN
Status: DISCONTINUED | OUTPATIENT
Start: 2025-01-30 | End: 2025-01-30 | Stop reason: HOSPADM

## 2025-01-30 RX ORDER — PANTOPRAZOLE SODIUM 40 MG/1
40 TABLET, DELAYED RELEASE ORAL
Status: DISCONTINUED | OUTPATIENT
Start: 2025-01-31 | End: 2025-01-31 | Stop reason: HOSPADM

## 2025-01-30 RX ORDER — SODIUM CHLORIDE, SODIUM LACTATE, POTASSIUM CHLORIDE, CALCIUM CHLORIDE 600; 310; 30; 20 MG/100ML; MG/100ML; MG/100ML; MG/100ML
125 INJECTION, SOLUTION INTRAVENOUS CONTINUOUS
Status: DISCONTINUED | OUTPATIENT
Start: 2025-01-30 | End: 2025-01-30 | Stop reason: HOSPADM

## 2025-01-30 RX ORDER — MIDAZOLAM HYDROCHLORIDE 1 MG/ML
INJECTION, SOLUTION INTRAMUSCULAR; INTRAVENOUS AS NEEDED
Status: DISCONTINUED | OUTPATIENT
Start: 2025-01-30 | End: 2025-01-30

## 2025-01-30 RX ORDER — ONDANSETRON HYDROCHLORIDE 2 MG/ML
INJECTION, SOLUTION INTRAVENOUS AS NEEDED
Status: DISCONTINUED | OUTPATIENT
Start: 2025-01-30 | End: 2025-01-30

## 2025-01-30 RX ORDER — GLYCOPYRROLATE 0.2 MG/ML
INJECTION INTRAMUSCULAR; INTRAVENOUS AS NEEDED
Status: DISCONTINUED | OUTPATIENT
Start: 2025-01-30 | End: 2025-01-30

## 2025-01-30 RX ORDER — ACETAMINOPHEN 325 MG/1
975 TABLET ORAL ONCE
Status: COMPLETED | OUTPATIENT
Start: 2025-01-30 | End: 2025-01-30

## 2025-01-30 RX ORDER — CYCLOBENZAPRINE HCL 5 MG
5 TABLET ORAL 3 TIMES DAILY
Status: DISCONTINUED | OUTPATIENT
Start: 2025-01-30 | End: 2025-01-31 | Stop reason: HOSPADM

## 2025-01-30 RX ORDER — PRAVASTATIN SODIUM 20 MG/1
80 TABLET ORAL DAILY
Status: DISCONTINUED | OUTPATIENT
Start: 2025-01-31 | End: 2025-01-31 | Stop reason: HOSPADM

## 2025-01-30 RX ORDER — ACETAMINOPHEN 325 MG/1
650 TABLET ORAL EVERY 6 HOURS
Status: DISCONTINUED | OUTPATIENT
Start: 2025-01-30 | End: 2025-01-31 | Stop reason: HOSPADM

## 2025-01-30 RX ORDER — ROCURONIUM BROMIDE 50 MG/5 ML
SYRINGE (ML) INTRAVENOUS AS NEEDED
Status: DISCONTINUED | OUTPATIENT
Start: 2025-01-30 | End: 2025-01-30

## 2025-01-30 RX ORDER — PHENYLEPHRINE HCL IN 0.9% NACL 1 MG/10 ML
SYRINGE (ML) INTRAVENOUS AS NEEDED
Status: DISCONTINUED | OUTPATIENT
Start: 2025-01-30 | End: 2025-01-30

## 2025-01-30 RX ORDER — PROPOFOL 10 MG/ML
INJECTION, EMULSION INTRAVENOUS AS NEEDED
Status: DISCONTINUED | OUTPATIENT
Start: 2025-01-30 | End: 2025-01-30

## 2025-01-30 RX ORDER — LABETALOL HYDROCHLORIDE 5 MG/ML
10 INJECTION, SOLUTION INTRAVENOUS EVERY 30 MIN PRN
Status: DISCONTINUED | OUTPATIENT
Start: 2025-01-30 | End: 2025-01-30 | Stop reason: HOSPADM

## 2025-01-30 RX ORDER — LOSARTAN POTASSIUM 100 MG/1
100 TABLET ORAL DAILY
Status: DISCONTINUED | OUTPATIENT
Start: 2025-01-30 | End: 2025-01-31 | Stop reason: HOSPADM

## 2025-01-30 RX ORDER — TAMSULOSIN HYDROCHLORIDE 0.4 MG/1
0.4 CAPSULE ORAL DAILY
Status: DISCONTINUED | OUTPATIENT
Start: 2025-01-31 | End: 2025-01-31 | Stop reason: HOSPADM

## 2025-01-30 RX ORDER — ONDANSETRON HYDROCHLORIDE 2 MG/ML
4 INJECTION, SOLUTION INTRAVENOUS EVERY 8 HOURS PRN
Status: DISCONTINUED | OUTPATIENT
Start: 2025-01-30 | End: 2025-01-31 | Stop reason: HOSPADM

## 2025-01-30 RX ORDER — OXYCODONE HYDROCHLORIDE 10 MG/1
10 TABLET ORAL EVERY 4 HOURS PRN
Status: DISCONTINUED | OUTPATIENT
Start: 2025-01-30 | End: 2025-01-31 | Stop reason: HOSPADM

## 2025-01-30 RX ORDER — DEXTROSE 50 % IN WATER (D50W) INTRAVENOUS SYRINGE
25
Status: DISCONTINUED | OUTPATIENT
Start: 2025-01-30 | End: 2025-01-31 | Stop reason: HOSPADM

## 2025-01-30 RX ORDER — ALBUTEROL SULFATE 0.83 MG/ML
2.5 SOLUTION RESPIRATORY (INHALATION) ONCE AS NEEDED
Status: DISCONTINUED | OUTPATIENT
Start: 2025-01-30 | End: 2025-01-30 | Stop reason: HOSPADM

## 2025-01-30 RX ORDER — ACETAMINOPHEN 325 MG/1
975 TABLET ORAL ONCE
Status: DISCONTINUED | OUTPATIENT
Start: 2025-01-30 | End: 2025-01-30 | Stop reason: HOSPADM

## 2025-01-30 RX ORDER — FENTANYL CITRATE 50 UG/ML
INJECTION, SOLUTION INTRAMUSCULAR; INTRAVENOUS AS NEEDED
Status: DISCONTINUED | OUTPATIENT
Start: 2025-01-30 | End: 2025-01-30

## 2025-01-30 RX ORDER — LIDOCAINE 560 MG/1
1 PATCH PERCUTANEOUS; TOPICAL; TRANSDERMAL DAILY
Status: DISCONTINUED | OUTPATIENT
Start: 2025-01-30 | End: 2025-01-31 | Stop reason: HOSPADM

## 2025-01-30 RX ORDER — DEXTROSE 50 % IN WATER (D50W) INTRAVENOUS SYRINGE
12.5
Status: DISCONTINUED | OUTPATIENT
Start: 2025-01-30 | End: 2025-01-31 | Stop reason: HOSPADM

## 2025-01-30 RX ORDER — OXYCODONE HYDROCHLORIDE 5 MG/1
2.5 TABLET ORAL EVERY 4 HOURS PRN
Status: DISCONTINUED | OUTPATIENT
Start: 2025-01-30 | End: 2025-01-31 | Stop reason: HOSPADM

## 2025-01-30 RX ORDER — ONDANSETRON HYDROCHLORIDE 2 MG/ML
4 INJECTION, SOLUTION INTRAVENOUS ONCE AS NEEDED
Status: COMPLETED | OUTPATIENT
Start: 2025-01-30 | End: 2025-01-30

## 2025-01-30 RX ORDER — KETOROLAC TROMETHAMINE 30 MG/ML
30 INJECTION, SOLUTION INTRAMUSCULAR; INTRAVENOUS EVERY 6 HOURS SCHEDULED
Status: DISCONTINUED | OUTPATIENT
Start: 2025-01-30 | End: 2025-01-31 | Stop reason: HOSPADM

## 2025-01-30 RX ORDER — OXYCODONE AND ACETAMINOPHEN 5; 325 MG/1; MG/1
1 TABLET ORAL EVERY 4 HOURS PRN
Status: DISCONTINUED | OUTPATIENT
Start: 2025-01-30 | End: 2025-01-30 | Stop reason: HOSPADM

## 2025-01-30 RX ORDER — METHADONE HYDROCHLORIDE 10 MG/1
TABLET ORAL AS NEEDED
Status: DISCONTINUED | OUTPATIENT
Start: 2025-01-30 | End: 2025-01-30

## 2025-01-30 RX ORDER — HYDRALAZINE HYDROCHLORIDE 20 MG/ML
10 INJECTION INTRAMUSCULAR; INTRAVENOUS EVERY 30 MIN PRN
Status: DISCONTINUED | OUTPATIENT
Start: 2025-01-30 | End: 2025-01-30 | Stop reason: HOSPADM

## 2025-01-30 RX ORDER — SODIUM CHLORIDE, SODIUM LACTATE, POTASSIUM CHLORIDE, CALCIUM CHLORIDE 600; 310; 30; 20 MG/100ML; MG/100ML; MG/100ML; MG/100ML
100 INJECTION, SOLUTION INTRAVENOUS CONTINUOUS
Status: DISCONTINUED | OUTPATIENT
Start: 2025-01-30 | End: 2025-01-31 | Stop reason: HOSPADM

## 2025-01-30 RX ORDER — NALOXONE HYDROCHLORIDE 0.4 MG/ML
0.2 INJECTION, SOLUTION INTRAMUSCULAR; INTRAVENOUS; SUBCUTANEOUS EVERY 5 MIN PRN
Status: DISCONTINUED | OUTPATIENT
Start: 2025-01-30 | End: 2025-01-31 | Stop reason: HOSPADM

## 2025-01-30 RX ORDER — TRANEXAMIC ACID 650 MG/1
1300 TABLET ORAL ONCE
Status: COMPLETED | OUTPATIENT
Start: 2025-01-30 | End: 2025-01-30

## 2025-01-30 RX ORDER — POLYETHYLENE GLYCOL 3350 17 G/17G
17 POWDER, FOR SOLUTION ORAL 2 TIMES DAILY
Status: DISCONTINUED | OUTPATIENT
Start: 2025-01-30 | End: 2025-01-31 | Stop reason: HOSPADM

## 2025-01-30 RX ORDER — HEPARIN SODIUM 5000 [USP'U]/ML
5000 INJECTION, SOLUTION INTRAVENOUS; SUBCUTANEOUS EVERY 8 HOURS
Status: DISCONTINUED | OUTPATIENT
Start: 2025-01-30 | End: 2025-01-31 | Stop reason: HOSPADM

## 2025-01-30 RX ORDER — OXYCODONE HYDROCHLORIDE 10 MG/1
10 TABLET ORAL EVERY 4 HOURS PRN
Status: DISCONTINUED | OUTPATIENT
Start: 2025-01-30 | End: 2025-01-30 | Stop reason: HOSPADM

## 2025-01-30 RX ORDER — HYDROMORPHONE HYDROCHLORIDE 0.2 MG/ML
0.2 INJECTION INTRAMUSCULAR; INTRAVENOUS; SUBCUTANEOUS EVERY 4 HOURS PRN
Status: DISCONTINUED | OUTPATIENT
Start: 2025-01-30 | End: 2025-01-31 | Stop reason: HOSPADM

## 2025-01-30 RX ORDER — OXYCODONE HYDROCHLORIDE 5 MG/1
5 TABLET ORAL EVERY 4 HOURS PRN
Status: DISCONTINUED | OUTPATIENT
Start: 2025-01-30 | End: 2025-01-31 | Stop reason: HOSPADM

## 2025-01-30 RX ORDER — LIDOCAINE HYDROCHLORIDE 20 MG/ML
INJECTION, SOLUTION EPIDURAL; INFILTRATION; INTRACAUDAL; PERINEURAL AS NEEDED
Status: DISCONTINUED | OUTPATIENT
Start: 2025-01-30 | End: 2025-01-30

## 2025-01-30 RX ORDER — LATANOPROST 50 UG/ML
1 SOLUTION/ DROPS OPHTHALMIC NIGHTLY
Status: DISCONTINUED | OUTPATIENT
Start: 2025-01-30 | End: 2025-01-31 | Stop reason: HOSPADM

## 2025-01-30 RX ORDER — ALBUMIN HUMAN 50 G/1000ML
SOLUTION INTRAVENOUS AS NEEDED
Status: DISCONTINUED | OUTPATIENT
Start: 2025-01-30 | End: 2025-01-30

## 2025-01-30 RX ORDER — IBUPROFEN 600 MG/1
600 TABLET ORAL EVERY 6 HOURS PRN
Status: DISCONTINUED | OUTPATIENT
Start: 2025-01-30 | End: 2025-01-30 | Stop reason: HOSPADM

## 2025-01-30 RX ORDER — ONDANSETRON 4 MG/1
4 TABLET, FILM COATED ORAL EVERY 8 HOURS PRN
Status: DISCONTINUED | OUTPATIENT
Start: 2025-01-30 | End: 2025-01-31 | Stop reason: HOSPADM

## 2025-01-30 RX ORDER — BISACODYL 5 MG
10 TABLET, DELAYED RELEASE (ENTERIC COATED) ORAL DAILY PRN
Status: DISCONTINUED | OUTPATIENT
Start: 2025-01-30 | End: 2025-01-31 | Stop reason: HOSPADM

## 2025-01-30 RX ADMIN — MIDAZOLAM 2 MG: 1 INJECTION INTRAMUSCULAR; INTRAVENOUS at 07:55

## 2025-01-30 RX ADMIN — CEFAZOLIN SODIUM 2 G: 2 INJECTION, SOLUTION INTRAVENOUS at 08:13

## 2025-01-30 RX ADMIN — Medication 20 MG: at 10:33

## 2025-01-30 RX ADMIN — HYDROMORPHONE HYDROCHLORIDE 1 MG: 1 INJECTION, SOLUTION INTRAMUSCULAR; INTRAVENOUS; SUBCUTANEOUS at 14:22

## 2025-01-30 RX ADMIN — Medication 50 MG: at 08:03

## 2025-01-30 RX ADMIN — PROPOFOL 50 MG: 10 INJECTION, EMULSION INTRAVENOUS at 08:06

## 2025-01-30 RX ADMIN — HEPARIN SODIUM 5000 UNITS: 5000 INJECTION INTRAVENOUS; SUBCUTANEOUS at 23:47

## 2025-01-30 RX ADMIN — KETOROLAC TROMETHAMINE 30 MG: 30 INJECTION, SOLUTION INTRAMUSCULAR at 23:47

## 2025-01-30 RX ADMIN — TRANEXAMIC ACID 1300 MG: 650 TABLET ORAL at 07:04

## 2025-01-30 RX ADMIN — Medication 200 MCG: at 11:16

## 2025-01-30 RX ADMIN — ACETAMINOPHEN 650 MG: 325 TABLET ORAL at 22:01

## 2025-01-30 RX ADMIN — HYDROMORPHONE HYDROCHLORIDE 0.5 MG: 1 INJECTION, SOLUTION INTRAMUSCULAR; INTRAVENOUS; SUBCUTANEOUS at 12:36

## 2025-01-30 RX ADMIN — FENTANYL CITRATE 50 MCG: 50 INJECTION, SOLUTION INTRAMUSCULAR; INTRAVENOUS at 08:02

## 2025-01-30 RX ADMIN — ACETAMINOPHEN 975 MG: 325 TABLET ORAL at 07:04

## 2025-01-30 RX ADMIN — Medication 10 MG: at 10:16

## 2025-01-30 RX ADMIN — HYDROMORPHONE HYDROCHLORIDE 1 MG: 1 INJECTION, SOLUTION INTRAMUSCULAR; INTRAVENOUS; SUBCUTANEOUS at 13:09

## 2025-01-30 RX ADMIN — POLYETHYLENE GLYCOL 3350 17 G: 17 POWDER, FOR SOLUTION ORAL at 22:02

## 2025-01-30 RX ADMIN — METHADONE HYDROCHLORIDE 10 MG: 10 TABLET ORAL at 07:20

## 2025-01-30 RX ADMIN — FENTANYL CITRATE 50 MCG: 50 INJECTION, SOLUTION INTRAMUSCULAR; INTRAVENOUS at 08:41

## 2025-01-30 RX ADMIN — HYDROMORPHONE HYDROCHLORIDE 1 MG: 1 INJECTION, SOLUTION INTRAMUSCULAR; INTRAVENOUS; SUBCUTANEOUS at 12:48

## 2025-01-30 RX ADMIN — CYCLOBENZAPRINE HYDROCHLORIDE 5 MG: 5 TABLET, FILM COATED ORAL at 22:01

## 2025-01-30 RX ADMIN — ALBUMIN HUMAN 250 ML: 0.05 INJECTION, SOLUTION INTRAVENOUS at 11:21

## 2025-01-30 RX ADMIN — ACETAMINOPHEN 650 MG: 325 TABLET ORAL at 16:18

## 2025-01-30 RX ADMIN — SODIUM CHLORIDE, POTASSIUM CHLORIDE, SODIUM LACTATE AND CALCIUM CHLORIDE: 600; 310; 30; 20 INJECTION, SOLUTION INTRAVENOUS at 07:55

## 2025-01-30 RX ADMIN — Medication 30 MG: at 11:01

## 2025-01-30 RX ADMIN — Medication 10 MG: at 11:01

## 2025-01-30 RX ADMIN — Medication 200 MCG: at 11:03

## 2025-01-30 RX ADMIN — HYDROMORPHONE HYDROCHLORIDE 0.5 MG: 1 INJECTION, SOLUTION INTRAMUSCULAR; INTRAVENOUS; SUBCUTANEOUS at 12:41

## 2025-01-30 RX ADMIN — Medication 20 MG: at 09:40

## 2025-01-30 RX ADMIN — CELECOXIB 400 MG: 200 CAPSULE ORAL at 07:04

## 2025-01-30 RX ADMIN — Medication 30 MG: at 08:23

## 2025-01-30 RX ADMIN — ONDANSETRON 4 MG: 2 INJECTION INTRAMUSCULAR; INTRAVENOUS at 13:09

## 2025-01-30 RX ADMIN — LIDOCAINE HYDROCHLORIDE 60 MG: 20 INJECTION, SOLUTION EPIDURAL; INFILTRATION; INTRACAUDAL; PERINEURAL at 08:02

## 2025-01-30 RX ADMIN — SODIUM CHLORIDE, POTASSIUM CHLORIDE, SODIUM LACTATE AND CALCIUM CHLORIDE: 600; 310; 30; 20 INJECTION, SOLUTION INTRAVENOUS at 10:56

## 2025-01-30 RX ADMIN — GLYCOPYRROLATE 0.2 MG: 0.2 INJECTION, SOLUTION INTRAMUSCULAR; INTRAVENOUS at 07:55

## 2025-01-30 RX ADMIN — Medication 200 MCG: at 10:48

## 2025-01-30 RX ADMIN — Medication 20 MG: at 08:43

## 2025-01-30 RX ADMIN — Medication 10 MG: at 12:15

## 2025-01-30 RX ADMIN — Medication 200 MCG: at 11:09

## 2025-01-30 RX ADMIN — PROPOFOL 150 MG: 10 INJECTION, EMULSION INTRAVENOUS at 08:02

## 2025-01-30 RX ADMIN — KETOROLAC TROMETHAMINE 30 MG: 30 INJECTION, SOLUTION INTRAMUSCULAR at 17:38

## 2025-01-30 RX ADMIN — PROPOFOL 30 MCG/KG/MIN: 10 INJECTION, EMULSION INTRAVENOUS at 08:50

## 2025-01-30 RX ADMIN — OXYCODONE HYDROCHLORIDE 10 MG: 10 TABLET ORAL at 22:02

## 2025-01-30 RX ADMIN — LOSARTAN POTASSIUM 100 MG: 100 TABLET, FILM COATED ORAL at 16:18

## 2025-01-30 RX ADMIN — SUGAMMADEX 200 MG: 100 INJECTION, SOLUTION INTRAVENOUS at 12:30

## 2025-01-30 RX ADMIN — CEFAZOLIN SODIUM 2 G: 2 INJECTION, SOLUTION INTRAVENOUS at 12:13

## 2025-01-30 RX ADMIN — POVIDONE-IODINE 1 APPLICATION: 5 SOLUTION TOPICAL at 07:07

## 2025-01-30 RX ADMIN — LIDOCAINE 4% 1 PATCH: 40 PATCH TOPICAL at 16:18

## 2025-01-30 RX ADMIN — SODIUM CHLORIDE, POTASSIUM CHLORIDE, SODIUM LACTATE AND CALCIUM CHLORIDE 100 ML/HR: 600; 310; 30; 20 INJECTION, SOLUTION INTRAVENOUS at 17:38

## 2025-01-30 RX ADMIN — DEXAMETHASONE SODIUM PHOSPHATE 8 MG: 4 INJECTION, SOLUTION INTRAMUSCULAR; INTRAVENOUS at 08:43

## 2025-01-30 RX ADMIN — Medication 200 MCG: at 10:53

## 2025-01-30 RX ADMIN — ONDANSETRON 4 MG: 2 INJECTION INTRAMUSCULAR; INTRAVENOUS at 12:14

## 2025-01-30 RX ADMIN — CYCLOBENZAPRINE HYDROCHLORIDE 5 MG: 5 TABLET, FILM COATED ORAL at 16:18

## 2025-01-30 SDOH — ECONOMIC STABILITY: INCOME INSECURITY: IN THE PAST 12 MONTHS HAS THE ELECTRIC, GAS, OIL, OR WATER COMPANY THREATENED TO SHUT OFF SERVICES IN YOUR HOME?: NO

## 2025-01-30 SDOH — SOCIAL STABILITY: SOCIAL INSECURITY
WITHIN THE LAST YEAR, HAVE YOU BEEN KICKED, HIT, SLAPPED, OR OTHERWISE PHYSICALLY HURT BY YOUR PARTNER OR EX-PARTNER?: NO

## 2025-01-30 SDOH — SOCIAL STABILITY: SOCIAL INSECURITY: HAS ANYONE EVER THREATENED TO HURT YOUR FAMILY OR YOUR PETS?: NO

## 2025-01-30 SDOH — ECONOMIC STABILITY: FOOD INSECURITY: WITHIN THE PAST 12 MONTHS, THE FOOD YOU BOUGHT JUST DIDN'T LAST AND YOU DIDN'T HAVE MONEY TO GET MORE.: NEVER TRUE

## 2025-01-30 SDOH — SOCIAL STABILITY: SOCIAL INSECURITY: WITHIN THE LAST YEAR, HAVE YOU BEEN AFRAID OF YOUR PARTNER OR EX-PARTNER?: NO

## 2025-01-30 SDOH — SOCIAL STABILITY: SOCIAL INSECURITY
WITHIN THE LAST YEAR, HAVE YOU BEEN RAPED OR FORCED TO HAVE ANY KIND OF SEXUAL ACTIVITY BY YOUR PARTNER OR EX-PARTNER?: NO

## 2025-01-30 SDOH — SOCIAL STABILITY: SOCIAL INSECURITY: WITHIN THE LAST YEAR, HAVE YOU BEEN HUMILIATED OR EMOTIONALLY ABUSED IN OTHER WAYS BY YOUR PARTNER OR EX-PARTNER?: NO

## 2025-01-30 SDOH — HEALTH STABILITY: MENTAL HEALTH: CURRENT SMOKER: 0

## 2025-01-30 SDOH — SOCIAL STABILITY: SOCIAL INSECURITY: ARE THERE ANY APPARENT SIGNS OF INJURIES/BEHAVIORS THAT COULD BE RELATED TO ABUSE/NEGLECT?: NO

## 2025-01-30 SDOH — ECONOMIC STABILITY: FOOD INSECURITY: WITHIN THE PAST 12 MONTHS, YOU WORRIED THAT YOUR FOOD WOULD RUN OUT BEFORE YOU GOT THE MONEY TO BUY MORE.: NEVER TRUE

## 2025-01-30 SDOH — SOCIAL STABILITY: SOCIAL INSECURITY: DO YOU FEEL ANYONE HAS EXPLOITED OR TAKEN ADVANTAGE OF YOU FINANCIALLY OR OF YOUR PERSONAL PROPERTY?: NO

## 2025-01-30 SDOH — SOCIAL STABILITY: SOCIAL INSECURITY: WERE YOU ABLE TO COMPLETE ALL THE BEHAVIORAL HEALTH SCREENINGS?: YES

## 2025-01-30 SDOH — SOCIAL STABILITY: SOCIAL INSECURITY: ABUSE: ADULT

## 2025-01-30 SDOH — SOCIAL STABILITY: SOCIAL INSECURITY: HAVE YOU HAD THOUGHTS OF HARMING ANYONE ELSE?: NO

## 2025-01-30 SDOH — SOCIAL STABILITY: SOCIAL INSECURITY: DOES ANYONE TRY TO KEEP YOU FROM HAVING/CONTACTING OTHER FRIENDS OR DOING THINGS OUTSIDE YOUR HOME?: NO

## 2025-01-30 SDOH — SOCIAL STABILITY: SOCIAL INSECURITY: ARE YOU OR HAVE YOU BEEN THREATENED OR ABUSED PHYSICALLY, EMOTIONALLY, OR SEXUALLY BY ANYONE?: NO

## 2025-01-30 SDOH — SOCIAL STABILITY: SOCIAL INSECURITY: DO YOU FEEL UNSAFE GOING BACK TO THE PLACE WHERE YOU ARE LIVING?: NO

## 2025-01-30 ASSESSMENT — PAIN SCALES - GENERAL
PAINLEVEL_OUTOF10: 4
PAINLEVEL_OUTOF10: 6
PAINLEVEL_OUTOF10: 6
PAINLEVEL_OUTOF10: 7
PAINLEVEL_OUTOF10: 5 - MODERATE PAIN
PAINLEVEL_OUTOF10: 7
PAINLEVEL_OUTOF10: 2
PAIN_LEVEL: 3
PAINLEVEL_OUTOF10: 5 - MODERATE PAIN
PAINLEVEL_OUTOF10: 9

## 2025-01-30 ASSESSMENT — ACTIVITIES OF DAILY LIVING (ADL)
LACK_OF_TRANSPORTATION: NO
HEARING - RIGHT EAR: FUNCTIONAL
ADEQUATE_TO_COMPLETE_ADL: YES
WALKS IN HOME: INDEPENDENT
HEARING - LEFT EAR: FUNCTIONAL
DRESSING YOURSELF: INDEPENDENT
TOILETING: INDEPENDENT
JUDGMENT_ADEQUATE_SAFELY_COMPLETE_DAILY_ACTIVITIES: YES
PATIENT'S MEMORY ADEQUATE TO SAFELY COMPLETE DAILY ACTIVITIES?: YES
GROOMING: INDEPENDENT
FEEDING YOURSELF: INDEPENDENT
BATHING: INDEPENDENT

## 2025-01-30 ASSESSMENT — PATIENT HEALTH QUESTIONNAIRE - PHQ9
SUM OF ALL RESPONSES TO PHQ9 QUESTIONS 1 & 2: 0
2. FEELING DOWN, DEPRESSED OR HOPELESS: NOT AT ALL
1. LITTLE INTEREST OR PLEASURE IN DOING THINGS: NOT AT ALL

## 2025-01-30 ASSESSMENT — PAIN - FUNCTIONAL ASSESSMENT
PAIN_FUNCTIONAL_ASSESSMENT: 0-10

## 2025-01-30 ASSESSMENT — COGNITIVE AND FUNCTIONAL STATUS - GENERAL
WALKING IN HOSPITAL ROOM: A LITTLE
PATIENT BASELINE BEDBOUND: NO
STANDING UP FROM CHAIR USING ARMS: A LITTLE
MOVING TO AND FROM BED TO CHAIR: A LITTLE
TURNING FROM BACK TO SIDE WHILE IN FLAT BAD: A LITTLE
DRESSING REGULAR LOWER BODY CLOTHING: A LITTLE
CLIMB 3 TO 5 STEPS WITH RAILING: A LITTLE
MOBILITY SCORE: 18
DAILY ACTIVITIY SCORE: 23
MOVING FROM LYING ON BACK TO SITTING ON SIDE OF FLAT BED WITH BEDRAILS: A LITTLE

## 2025-01-30 ASSESSMENT — LIFESTYLE VARIABLES
HOW MANY STANDARD DRINKS CONTAINING ALCOHOL DO YOU HAVE ON A TYPICAL DAY: 1 OR 2
HOW OFTEN DO YOU HAVE A DRINK CONTAINING ALCOHOL: 2-4 TIMES A MONTH
HOW OFTEN DO YOU HAVE 6 OR MORE DRINKS ON ONE OCCASION: NEVER
AUDIT-C TOTAL SCORE: 2
AUDIT-C TOTAL SCORE: 2
SKIP TO QUESTIONS 9-10: 1

## 2025-01-30 ASSESSMENT — PAIN DESCRIPTION - LOCATION: LOCATION: BACK

## 2025-01-30 NOTE — ANESTHESIA PROCEDURE NOTES
Arterial Line:    Date/Time: 1/30/2025 8:17 AM    Staffing  Performed: DICK and attending   Authorized by: Jenny Richter MD    Performed by: JOHN Melton    An arterial line was placed. Procedure performed using surface landmarks.in the OR for the following indication(s): continuous blood pressure monitoring.    A 20 gauge (size) (length) (type) catheter was placed into the Left radial artery, secured by Tegaderm,   Seldinger technique used.  Events:  patient tolerated procedure well with no complications.

## 2025-01-30 NOTE — DISCHARGE INSTR - OTHER ORDERS
If you have any questions or concerns about your discharge instructions, please call the unit at  and ask to speak with the charge nurse. Thank you for choosing [facility]. It was our pleasure to care for you.

## 2025-01-30 NOTE — DISCHARGE INSTR - ACTIVITY
Call Dr. Arita for any problems and/or concerns.  *Maintain spine precautions  *Log roll as instructed  *Walk as much as possible  *Avoid pushing, pulling, bending, twisting, and sitting/laying down in the same position for long periods of time  *No baths, hot tubs, or pools until cleared by physician; no lotions, creams, ointments over incision  *You may shower, do not soak or scrub incision; pat dry  *Continue the coughing and deep breathing exercises that you learned in the hospital  *Do not engage in sports, heavy work or lifting  *If you have a brace/collar-wear as instructed by physician and/or therapy, keep the brace/collar clean and dry, check the skin around the brace/collar every day and notify your physician of any concerns    Call Doctor right away for:  *Fever above 100.4/shaking chills  *New pain, weakness, warmth, or numbness in your legs  *Foot, ankle, or calf swelling that is not relieved by elevating your feet  *Inability to urinate/move bowels  *A severe headache  *Chest pain/shortness of breath-call 911  *Difficulty swallowing (cervical surgery)    If your doctor has ordered compression stockings, wear as directed as they help to prevent blood clots and reduce swelling in your legs    Do not use any products that contain nicotine or tobacco, such as cigarettes, e-cigarettes, and chewing tobacco. These can delay bone healing after surgery. If you need help quitting, ask your healthcare provider    -No heavy lifting or straining until patient is seen in the office.  -Encourage ambulation at least 3 times a day.  -You must be accompanied by a responsible adult upon discharge and for 24 hours after surgery.  Do not drive a motor vehicle, operate machinery, power tools or appliances, drink alcoholic beverages, or make critical decisions for 24 hours and while on narcotic pain meds.  -Be aware of dizziness, which may cause a fall.  Change positions slowly.  -You may resume your regular diet, but do so  slowly.  -Use your pain medication prescription as prescribed by your physician.  If possible, take your medication with food, and drink plenty of fluids.  -Call your surgeon if you have questions, temperature of 100.4° or greater, increased bleeding swelling or pain, drainage from the incision or increased redness around the incision.

## 2025-01-30 NOTE — OP NOTE
L4-L5, L5-S1 Anterior Lumbar Interbody Fusion with screws,    DR. PLASCENCIA TO ASSIST Operative Note     Date: 2025  OR Location: [facility] OR    Name: Kevin Olea, : 1960, Age: 64 y.o., MRN: [id number], Sex: male    Diagnosis  Pre-op Diagnosis      * Lumbar radiculopathy [M54.16]     * Back pain, unspecified back location, unspecified back pain laterality, unspecified chronicity [M54.9] Post-op Diagnosis     * Lumbar radiculopathy [M54.16]     * Back pain, unspecified back location, unspecified back pain laterality, unspecified chronicity [M54.9]     Procedures  L4-L5, L5-S1 Anterior Lumbar Interbody Fusion with screws,    DR. PLASCENCIA TO ASSIST  25737 - [place] ARTHRD ANT INTERBODY MIN DSC LUMBAR      Surgeons      * Devon Arita - Primary    Resident/Fellow/Other Assistant:  Seferino Plascencia DO  Co-Surgeon    Staff:   Surgical Assistant:   Circulator: Stacy  Scrub Person: Sheri  Circulator: Odette  Scrub Person: Hyacinth  Scrub Person: Nando Miles Circulator: Zuleyma    Anesthesia Staff: Anesthesiologist: Jenny Richter MD  C-AA: JOHN Melton  DICK: Brigid Peck  Frontline Breaker: JOHN Hernandez    Procedure Summary  Anesthesia: Anesthesia type not filed in the log.  ASA: ASA status not filed in the log.  Estimated Blood Loss: 25mL  Intra-op Medications:   Administrations occurring from 0800 to 1340 on 25:   Medication Name Total Dose   lidocaine-epinephrine (Xylocaine W/EPI) 1 %-1:100,000 injection 30 mL   ondansetron (Zofran) injection 4 mg 4 mg   albumin human 5 % 250 mL   ceFAZolin (Ancef) IV 2 g in 100 mL dextrose (iso) - premix 4 g   dexAMETHasone (Decadron) injection 4 mg/mL 8 mg   fentaNYL (Sublimaze) injection 50 mcg/mL 100 mcg   HYDROmorphone (Dilaudid) injection 1 mg 2 mg   HYDROmorphone (Dilaudid) injection 1 mg/mL 1 mg   ketamine injection 50 mg/ 5 mL (10 mg/mL) 50 mg   LR bolus Cannot be calculated   lidocaine PF (Xylocaine-MPF) local injection 2 % 60 mg   ondansetron  (Zofran) 2 mg/mL injection 4 mg   phenylephrine 100 mcg/mL syringe 10 mL (prefilled) 1,000 mcg   propofol (Diprivan) injection 10 mg/mL 698.96 mg   rocuronium (Zemuron) 50 mg/5 mL prefilled syringe 150 mg   sugammadex (Bridion) 200 mg/2 mL injection 200 mg              Anesthesia Record               Intraprocedure I/O Totals          Intake    LR bolus 1700.00 mL    Total Intake 1700 mL       Output    Urine 500 mL    Est. Blood Loss 100 mL    Total Output 600 mL       Net    Net Volume 1100 mL          Specimen: No specimens collected              Drains and/or Catheters:   Urethral Catheter Non-latex 16 Fr. (Active)   Site Assessment Clean;Skin intact 01/30/25 1500   Collection Container Standard drainage bag 01/30/25 1500   Securement Method Securing device (Describe) 01/30/25 1500   Reason for Continuing Urinary Catheterization surgical procedures: urological/gynecological, pelvic oncology, anal, prolonged surgical procedure 01/30/25 1526       Tourniquet Times:         Implants:  Implants       Type Name Action Serial No.      Graft PUTTY, DBM, PROPEL, LARGE - [id number] Implanted       8MM X 38MM X 28MM NUVASIVE 3DP INTERFIXATED ALIF SPINAL CAGE, 15-DEGREE Implanted       8MM X 38MM X 28MM NUVASIVE 3DP INTERFIXATED ALIF SPINAL CAGE, 10-DEGREE Implanted      Screw PIN, PERCUTANEOUS, 100MM, STEALTH JO - [id number] Used, Not Implanted      Screw 7.5 X 50 SCREW Implanted      Screw SCREW, RELINE MAS RED, 7.5X45MM POLY 2C - [id number] Implanted       70 KANDICE Implanted      Neuro Interventional Implant SCREW, RELINE LOCK, 5.5MM OPEN TULIP - [id number] Implanted       20MM X 5.0 SCREW Implanted               Findings:      Indications: Kevin Olea is an 64 y.o. male who is having surgery for Lumbar radiculopathy [M54.16]  Back pain, unspecified back location, unspecified back pain laterality, unspecified chronicity [M54.9].      The patient was seen in the preoperative area. The risks, benefits, complications,  treatment options, non-operative alternatives, expected recovery and outcomes were discussed with the patient. The possibilities of reaction to medication, pulmonary aspiration, injury to surrounding structures, bleeding, recurrent infection, the need for additional procedures, failure to diagnose a condition, and creating a complication requiring transfusion or operation were discussed with the patient. The patient concurred with the proposed plan, giving informed consent.  The site of surgery was properly noted/marked if necessary per policy. The patient has been actively warmed in preoperative area. Preoperative antibiotics have been ordered and given within 1 hours of incision. Venous thrombosis prophylaxis have been ordered including bilateral sequential compression devices    Procedure Details: Patient is brought to the OR suite placed in supine position ministered general anesthetic with endotracheal tube intubation NG Houston catheters are placed.  Anteabdominal wall is sterilely prepped and draped standard fashion.  Infraumbilical midline incision was made subcutaneous dissection Ontak electrocautery the rectus sheath was noted and incised in the middle.  We entered the retrorectus space with no difficulty peritoneum was retracted superiorly into the right self-retaining retractor was also placed.  Adequate L4-L5 dissection is noted lumbar segmentals were identified and ligated.  Adequate spacing noted subsequently adequate L5-S1 interspace was identified with middle sacral veins arteries site ligated in standard fashion.  C-arm was utilized to confirm L5-S1 and L5 for L5 interspace seen.  At this time neuro-spine proceeded with discectomy cage placement at L5-S1 replaced the retractors at L4-L5 spine proceeded with discectomy and cage placement here as well.  Please see neurosurgery for specific implant details.  Patient tolerated the 2 levels well without event excellent hemostasis maintained close the  fascia with #1 looped PDS 2-0  Vicryl sutures were placed as well as skin clips to skin subsequently spine proceeded with posterior approach.    None; patient tolerated the procedure well.    Disposition: PACU - hemodynamically stable.  Condition: stable             Additional Details:      Seferino Plascencia, DO  [phone]

## 2025-01-30 NOTE — ANESTHESIA POSTPROCEDURE EVALUATION
Patient: Kevin Olea    Procedure Summary       Date: 01/30/25 Room / Location: [facility] OR  / [facility] OR    Anesthesia Start: 0756 Anesthesia Stop: 1243    Procedure: L4-L5, L5-S1 Anterior Lumbar Interbody Fusion with screws,    DR. WHITFIELD TO ASSIST Diagnosis:       Lumbar radiculopathy      Back pain, unspecified back location, unspecified back pain laterality, unspecified chronicity      (Lumbar radiculopathy [M54.16])      (Back pain, unspecified back location, unspecified back pain laterality, unspecified chronicity [M54.9])    Surgeons: Devon Arita MD Responsible Provider: Jenny Richter MD    Anesthesia Type: general ASA Status: 3            Anesthesia Type: general    Vitals Value Taken Time   /69 01/30/25 1500   Temp 36 °C (96.8 °F) 01/30/25 1500   Pulse 98 01/30/25 1503   Resp 14 01/30/25 1503   SpO2 96 % 01/30/25 1503   Vitals shown include unfiled device data.    Anesthesia Post Evaluation    Patient location during evaluation: PACU  Patient participation: complete - patient participated  Level of consciousness: sleepy but conscious, responsive to verbal stimuli, responsive to light touch and responsive to physical stimuli  Pain score: 3  Pain management: satisfactory to patient  Airway patency: patent  Two or more strategies used to mitigate risk of obstructive sleep apnea  Cardiovascular status: acceptable, hemodynamically stable and stable  Respiratory status: acceptable, unassisted, spontaneous ventilation, nonlabored ventilation and face mask  Hydration status: acceptable  Postoperative Nausea and Vomiting: none        No notable events documented.

## 2025-01-30 NOTE — CARE PLAN
The patient's goals for the shift include      The clinical goals for the shift include Pt admission will be completed by end of shift 1/30/25    Pt goal met

## 2025-01-30 NOTE — ANESTHESIA PREPROCEDURE EVALUATION
Patient: Kevin Olea    Procedure Information       Anesthesia Start Date/Time: 01/30/25 0756    Procedure: L4-L5, L5-S1 Anterior Lumbar Interbody Fusion with screws,    DR. WHITFIELD TO ASSIST - [id number], 93797, 26258, 97441 x's 2, 70902, 36620  Dr. Arita is requesting 5 hours    Location: [facility] OR  / [facility]    Surgeons: Devon Arita MD            Relevant Problems   Cardiac   (+) Chest pain   (+) Hyperlipidemia   (+) Hypertension   (+) Low-density-lipoid-type (LDL) hyperlipoproteinemia      Pulmonary   (+) Mild intermittent asthma without complication (HHS-HCC)      Neuro   (+) Anxiety   (+) Bipolar affective disorder, remission status unspecified (Multi)   (+) Lumbar radiculopathy   (+) Lumbar radiculopathy, chronic      GI   (+) Gastroesophageal reflux disease      Endocrine   (+) Hypothyroidism      Musculoskeletal   (+) Cervical spondylosis without myelopathy   (+) Spinal stenosis, lumbar region, with neurogenic claudication      HEENT   (+) Acute sinusitis      ID   (+) Infected finger      Skin   (+) Rash       Clinical information reviewed:   Tobacco  Allergies  Meds  Problems  Med Hx  Surg Hx   Fam Hx  Soc   Hx        NPO Detail:  NPO/Void Status  Carbohydrate Drink Given Prior to Surgery? : N  Date of Last Liquid: 01/30/25  Time of Last Liquid: 0705  Date of Last Solid: 01/29/25  Time of Last Solid: 1800  Last Intake Type: Clear fluids  Time of Last Void: 0600         Physical Exam    Airway  Mallampati: III  TM distance: >3 FB  Neck ROM: full     Cardiovascular - normal exam  Rhythm: regular  Rate: normal     Dental - normal exam     Pulmonary   Breath sounds clear to auscultation  (+) decreased breath sounds     Abdominal   (+) obese  Abdomen: soft  Bowel sounds: normal           Anesthesia Plan    History of general anesthesia?: yes  History of complications of general anesthesia?: no    ASA 3     general     The patient is not a current smoker.  Patient was previously instructed to  abstain from smoking on day of procedure.  Patient did not smoke on day of procedure.  Education provided regarding risk of obstructive sleep apnea.  intravenous induction   Postoperative administration of opioids is intended.  Anesthetic plan and risks discussed with patient.  Use of blood products discussed with patient who consented to blood products.    Plan discussed with CAA.

## 2025-01-30 NOTE — NURSING NOTE
Shift note -   1540 - Pt arrived to the floor and was able to answer admission questions. Dtr is at bedside and updated on plan of care. Pt Denied severe pain . He reports some relief from the surgery. Pt looking over menu to order some liquids. Call light in reach and bed alarm on. Surgical sites observed and well approximated.     1720 - Pt  stable post op and medicated per orders. [place] in place and draining well. His daughter is at bedside and she was updated by physician. Pt able to log roll and turn in bed with assistance. Therapy did not see yet. Call light in reach and bed alarm on.

## 2025-01-30 NOTE — HOSPITAL COURSE
64F with h/o GERD, BPH, HLD, HTN, prior laminectomy 8 yrs ago, p/w BLE radiculopathy/LBP. 1/30 s/p L4-S1 anterior lumbar interbody fusion, L4-S1 percutaneous pedicle screw instrumentation  
[DrYareli  ___] : Dr. HARRINGTON

## 2025-01-30 NOTE — ANESTHESIA PROCEDURE NOTES
Airway  Date/Time: 1/30/2025 8:06 AM  Urgency: elective    Airway not difficult    Staffing  Performed: DICK   Authorized by: Jenny Richter MD    Performed by: JOHN Melton  Patient location during procedure: OR    Indications and Patient Condition  Indications for airway management: anesthesia  Spontaneous Ventilation: absent  Sedation level: deep  Preoxygenated: yes  Patient position: sniffing  Mask difficulty assessment: 1 - vent by mask  Planned trial extubation    Final Airway Details  Final airway type: endotracheal airway      Successful airway: ETT  Cuffed: yes   Successful intubation technique: video laryngoscopy  Facilitating devices/methods: intubating stylet  Blade: Valerio  Blade size: #4  ETT size (mm): 7.5  Cormack-Lehane Classification: grade I - full view of glottis  Placement verified by: chest auscultation and capnometry   Measured from: lips  ETT to lips (cm): 23  Number of attempts at approach: 1

## 2025-01-30 NOTE — OP NOTE
L4-L5, L5-S1 Anterior Lumbar Interbody Fusion with screws,    DR. PLASCENCIA TO ASSIST Operative Note     Date: 2025  OR Location: [facility] OR    Name: Kevin Olea, : 1960, Age: 64 y.o., MRN: [id number], Sex: male    Diagnosis  Pre-op Diagnosis      * Lumbar radiculopathy [M54.16]     * Back pain, unspecified back location, unspecified back pain laterality, unspecified chronicity [M54.9] Post-op Diagnosis     * Lumbar radiculopathy [M54.16]     * Back pain, unspecified back location, unspecified back pain laterality, unspecified chronicity [M54.9]     Procedures  L4-L5, L5-S1 Anterior Lumbar Interbody Fusion with screws,    DR. PLASCENCIA TO ASSIST  46817 - [place] ARTHRD ANT INTERBODY MIN DSC LUMBAR    NC POSTERIOR SEGMENTAL INSTRUMENTATION 3-6 VRT SEG [97563]  NC ALLOGRAFT FOR SPINE SURGERY ONLY MORSELIZED []  NC AUTOGRAFT SPINE SURGERY LOCAL FROM SAME INCISION []  NC INSJ BIOMCHN DEV INTERVERTEBRAL DSC SPC W/ARTHRD []  NC ARTHRD ANT NTRBD MIN DSC EA ADDL INTERSPACE []  NC STEREOTACTIC COMPUTER ASSISTED PX SPINAL [91668]  Surgeons      * Devon Arita - Primary    Resident/Fellow/Other Assistant:  Surgeons and Role:     * Seferino Plascencia DO - Assisting     * Sal Kapoor MD - Assisting    Staff:   Surgical Assistant:   Circulator: Stacy  Scrub Person: Sheri  Circulator: Odette  Scrub Person: Hyacinth  Scrub Person: Nando Miles Circulator: Zuleyma    Anesthesia Staff: Anesthesiologist: Jenny Richter MD  C-AA: JOHN Melton  DICK: Brigid Peck  Frontline Breaker: JOHN Hernandez    Procedure Summary  Anesthesia: Anesthesia type not filed in the log.  ASA: ASA status not filed in the log.  Estimated Blood Loss: 150mL  Intra-op Medications:   Administrations occurring from 0800 to 1340 on 25:   Medication Name Total Dose   lidocaine-epinephrine (Xylocaine W/EPI) 1 %-1:100,000 injection 30 mL   albumin human 5 % 250 mL   ceFAZolin (Ancef) IV 2 g in 100 mL dextrose (iso)  - premix 4 g   dexAMETHasone (Decadron) injection 4 mg/mL 8 mg   fentaNYL (Sublimaze) injection 50 mcg/mL 100 mcg   ketamine injection 50 mg/ 5 mL (10 mg/mL) 50 mg   LR bolus Cannot be calculated   lidocaine PF (Xylocaine-MPF) local injection 2 % 60 mg   ondansetron (Zofran) 2 mg/mL injection 4 mg   phenylephrine 100 mcg/mL syringe 10 mL (prefilled) 1,000 mcg   propofol (Diprivan) injection 10 mg/mL 698.96 mg   rocuronium (Zemuron) 50 mg/5 mL prefilled syringe 150 mg              Anesthesia Record               Intraprocedure I/O Totals          Intake    LR bolus 1000.00 mL    Total Intake 1000 mL       Output    Urine 200 mL    Total Output 200 mL       Net    Net Volume 800 mL          Specimen: No specimens collected              Drains and/or Catheters:   Urethral Catheter Non-latex 16 Fr. (Active)       Implants:  Implants       Type Name Action Serial No.      Graft PUTTY, DBM, PROPEL, LARGE - [id number] Implanted       8MM X 38MM X 28MM NUVASIVE 3DP INTERFIXATED ALIF SPINAL CAGE, 15-DEGREE Implanted       8MM X 38MM X 28MM NUVASIVE 3DP INTERFIXATED ALIF SPINAL CAGE, 10-DEGREE Implanted      Screw PIN, PERCUTANEOUS, 100MM, STEALTH JO - [id number], Not Implanted      Screw 7.5 X 50 SCREW Implanted      Screw SCREW, RELINE MAS RED, 7.5X45MM POLY 2C - [id number] Implanted       70 KANDICE Implanted      Neuro Interventional Implant SCREW, RELINE LOCK, 5.5MM OPEN TULIP - [id number] Implanted       20MM X 5.0 SCREW Implanted                   Informed Consent:  The risks, benefits, complications, and alternatives were discussed with the patient. I have explained the surgical procedure in detail with expected duration and extent of recovery along risks of surgery that include, but is not limited to bleeding, infection, blood vessel injury or damage, loss of sensation, loss of bladder, bowel or sexual function, nerve injury/damage resulting in weakness/paralysis, malunion, nonunion, CSF leak, brachial plexus  injury, peripheral vision blindness, failure of implants/fusion, failure to relieve symptoms, recurrent disease, adjacent segment disease, need to reoperate for any reason and general anesthesia reaction such as stroke, coma, heart attack, delirium, confusion, death as well as worsening of preexisted medical conditions.     I clearly emphasized that while the goal of surgery is to decompress the spinal cord so as to ARREST the progression of neurological deficits - preexisting deficits may or may not improve after surgery. We discussed that many patients do clinically improve in functional and neurological outcomes following decompression of the spinal elements in patients but the extent of which is variable and depends on the severity of pain, numbness, tingling, or weakness. With improvement seen of those symptoms in that order. We did discuss the goal of surgery to alleviate pain first and foremost and hope for recovery of all neurologic function with time.     All questions were answered and the patient was amenable to proceed.     INDICATIONS FOR THE PROCEDURE: Kevin Olea is an 64 y.o. male who is having surgery for Lumbar radiculopathy [M54.16]  Back pain, unspecified back location, unspecified back pain laterality, unspecified chronicity [M54.9].     DESCRIPTION OF THE OPERATION:   The patient was brought to the operating room theater. A verbal huddle was performed confirming the patient by name, date of birth, medical record number, site of surgery. After all team members were in agreement, she underwent anesthesia induction without complication. Two large bore IVs were placed as well as endotracheal tube. Perioperative antibiotic administration was confirmed. Patient's was placed supine onto flat kaylyn table and the arms placed at 90 degrees. Using lateral fluoroscopy we localized a midline abdominal incision to the L4-5 and L5-S1 disc spaces. Anterior access to the spine was performed by Dr. Gentile  Thais and will be dictated separately.     Once anterior aspect of the spine had been performed and retractor blades secured.  We then used a spinal needle to check into the L5-S1 disc space.  We brought in lateral fluoroscopy and again localized and confirmed our level of interest.  Next we began the discectomy, to begin used an 11 blade to open the disc and then used rongeurs as well as Kerrisons to perform a total discectomy and end plates were prepared with curettes for arthrodesis. Next a 15° titanium trial was then inserted into the disc space using lateral fluoroscopy and then this was removed and we then malleted and a 15° titanium hyperlordotic cage packed with osteocell allograft and demineralized bone matrix into the disc space.  Next the biomechanical cage was then fixated the anterior aspect of spine with placement of a screw into the sacrum.  The graft was cannulated with a tap and then screws were inserted and final tightened without complication securing the graft to the sacrum. Demineralized Bone Matrix was used for allograft.    Dr. Seferino Plascencia and team then scrubbed back into the procedure and performed exposure of L4-5 mobilized the large vessels at this level too. Once anterior aspect of the spine had been performed at L4-5 and retractor blades secured.  We then used a spinal needle to check into the L4-5 disc space.  We brought in lateral fluoroscopy and again localized and confirmed our level of interest.  Next we began the discectomy, to begin used an 11 blade to open the disc and then used rongeurs as well as Kerrisons to perform a total discectomy and end plates were prepared with curettes for arthrodesis. Next a 10° titanium trial was then inserted into the disc space using lateral fluoroscopy and then this was removed and we then malleted and a 10° titanium hyperlordotic cage packed with osteocell allograft and demineralized bone matrix into the disc space.  Next the biomechanical cage  was then fixated the anterior aspect of spine with placement of a screw into the body of L5.  The graft was cannulated with a tap and then screws were inserted and final tightened without complication securing the graft to the L5 vertebrae. Demineralized Bone Matrix was used for allograft.     Next a closure of the anterior aspect of the spine as well as the abdominal compartment was performed by Dr. Plascencia.  After anterior portion of the case had been completed we then turned our attention to the posterior portions.  The patient was flipped onto a prone Carl table post was performed without complication.      Next the back was then prepped and draped in a sterile fashion.  A pin was inserted into the iliac wing for navigation and the O arm was then brought in and an intraoperative CT scan was performed.  This was registered to the patient and then we planned out our incisions for the percutaneous placement of L4, L5 and S1 pedicle screws.  Next the stereotactic probe was placed under the patient and we carefully marked out the superior and inferior aspects of the incisions approximately 4 cm off midline bilaterally these incisions were marked and then infiltrated with lidocaine with epinephrine and opened with 15 blade using comminution blunt dissection and Bovie electric cautery we exposed the fascia and this was bisected in a linear fashion Using stereotactic navigation we marked out the entry site to the pedicles bilaterally and used a navigated matchstick drill to begin entry sites. Next using a fenestrated pedicle screw with a threaded tip we cannulated the pedicle at L4-S1 with 7.5mm screws without complication. We then placed a 70mm matt bilaterally and these were final tightened with set screw caps to 25 pounds of torque after placement of her posterior instrumentation lateral and AP fluoroscopy was used for final shot confirming our placement of our instrumentation copious amount of irrigation were then  used all the incisions the towers were broken off of the pedicle screws and removed and we began an multilayer closure    The muscle and fascia were approximated with 0 vicryl sutures and then 2-0 vicryl sutures were placed into the dermal layers and dermabond was placed onto the skin. The patient was then flipped onto a hospital bed and was extubated and returned to PACU in stable condition.     Disposition: PACU - hemodynamically stable.    Condition: stable    Attending Attestation: I was present and scrubbed for the key portions of the procedure.      Devon Arita MD, [facility]  Spine , [facility]  Darrell Meek and Keyla Meek Chair in Spinal Neurosurgery  Neurosurgery , [facility]  Complex Spine Surgery Fellowship Director   of Neurological Surgery  [employer]  Office: [phone]  Fax: [phone]

## 2025-01-31 ENCOUNTER — APPOINTMENT (OUTPATIENT)
Dept: RADIOLOGY | Facility: HOSPITAL | Age: 65
End: 2025-01-31
Payer: COMMERCIAL

## 2025-01-31 ENCOUNTER — PHARMACY VISIT (OUTPATIENT)
Dept: PHARMACY | Facility: CLINIC | Age: 65
End: 2025-01-31
Payer: MEDICARE

## 2025-01-31 VITALS
HEART RATE: 65 BPM | SYSTOLIC BLOOD PRESSURE: 148 MMHG | WEIGHT: 238.1 LBS | BODY MASS INDEX: 34.09 KG/M2 | RESPIRATION RATE: 16 BRPM | HEIGHT: 70 IN | DIASTOLIC BLOOD PRESSURE: 71 MMHG | TEMPERATURE: 97.7 F | OXYGEN SATURATION: 97 %

## 2025-01-31 LAB
ANION GAP SERPL CALC-SCNC: 13 MMOL/L (ref 10–20)
ATRIAL RATE: 69 BPM
BUN SERPL-MCNC: 11 MG/DL (ref 6–23)
CALCIUM SERPL-MCNC: 7.9 MG/DL (ref 8.6–10.3)
CHLORIDE SERPL-SCNC: 103 MMOL/L (ref 98–107)
CO2 SERPL-SCNC: 25 MMOL/L (ref 21–32)
CREAT SERPL-MCNC: 0.84 MG/DL (ref 0.5–1.3)
EGFRCR SERPLBLD CKD-EPI 2021: >90 ML/MIN/1.73M*2
ERYTHROCYTE [DISTWIDTH] IN BLOOD BY AUTOMATED COUNT: 11.7 % (ref 11.5–14.5)
GLUCOSE BLD MANUAL STRIP-MCNC: 108 MG/DL (ref 74–99)
GLUCOSE BLD MANUAL STRIP-MCNC: 92 MG/DL (ref 74–99)
GLUCOSE SERPL-MCNC: 110 MG/DL (ref 74–99)
HCT VFR BLD AUTO: 36.1 % (ref 41–52)
HGB BLD-MCNC: 12 G/DL (ref 13.5–17.5)
MCH RBC QN AUTO: 32.3 PG (ref 26–34)
MCHC RBC AUTO-ENTMCNC: 33.2 G/DL (ref 32–36)
MCV RBC AUTO: 97 FL (ref 80–100)
NRBC BLD-RTO: 0 /100 WBCS (ref 0–0)
P AXIS: 12 DEGREES
P OFFSET: 203 MS
P ONSET: 147 MS
PLATELET # BLD AUTO: 177 X10*3/UL (ref 150–450)
POTASSIUM SERPL-SCNC: 3.8 MMOL/L (ref 3.5–5.3)
PR INTERVAL: 146 MS
Q ONSET: 220 MS
QRS COUNT: 12 BEATS
QRS DURATION: 96 MS
QT INTERVAL: 410 MS
QTC CALCULATION(BAZETT): 439 MS
QTC FREDERICIA: 429 MS
R AXIS: -3 DEGREES
RBC # BLD AUTO: 3.72 X10*6/UL (ref 4.5–5.9)
SODIUM SERPL-SCNC: 137 MMOL/L (ref 136–145)
T AXIS: 26 DEGREES
T OFFSET: 425 MS
VENTRICULAR RATE: 69 BPM
WBC # BLD AUTO: 10.7 X10*3/UL (ref 4.4–11.3)

## 2025-01-31 PROCEDURE — 85027 COMPLETE CBC AUTOMATED: CPT | Performed by: STUDENT IN AN ORGANIZED HEALTH CARE EDUCATION/TRAINING PROGRAM

## 2025-01-31 PROCEDURE — 2500000005 HC RX 250 GENERAL PHARMACY W/O HCPCS: Performed by: STUDENT IN AN ORGANIZED HEALTH CARE EDUCATION/TRAINING PROGRAM

## 2025-01-31 PROCEDURE — 2500000001 HC RX 250 WO HCPCS SELF ADMINISTERED DRUGS (ALT 637 FOR MEDICARE OP): Performed by: STUDENT IN AN ORGANIZED HEALTH CARE EDUCATION/TRAINING PROGRAM

## 2025-01-31 PROCEDURE — 36415 COLL VENOUS BLD VENIPUNCTURE: CPT | Performed by: STUDENT IN AN ORGANIZED HEALTH CARE EDUCATION/TRAINING PROGRAM

## 2025-01-31 PROCEDURE — 72100 X-RAY EXAM L-S SPINE 2/3 VWS: CPT

## 2025-01-31 PROCEDURE — 2500000004 HC RX 250 GENERAL PHARMACY W/ HCPCS (ALT 636 FOR OP/ED): Performed by: STUDENT IN AN ORGANIZED HEALTH CARE EDUCATION/TRAINING PROGRAM

## 2025-01-31 PROCEDURE — 2500000002 HC RX 250 W HCPCS SELF ADMINISTERED DRUGS (ALT 637 FOR MEDICARE OP, ALT 636 FOR OP/ED): Performed by: STUDENT IN AN ORGANIZED HEALTH CARE EDUCATION/TRAINING PROGRAM

## 2025-01-31 PROCEDURE — RXMED WILLOW AMBULATORY MEDICATION CHARGE

## 2025-01-31 PROCEDURE — 80048 BASIC METABOLIC PNL TOTAL CA: CPT | Performed by: STUDENT IN AN ORGANIZED HEALTH CARE EDUCATION/TRAINING PROGRAM

## 2025-01-31 PROCEDURE — 97161 PT EVAL LOW COMPLEX 20 MIN: CPT | Mod: GP

## 2025-01-31 PROCEDURE — 82947 ASSAY GLUCOSE BLOOD QUANT: CPT

## 2025-01-31 PROCEDURE — 97165 OT EVAL LOW COMPLEX 30 MIN: CPT | Mod: GO | Performed by: OCCUPATIONAL THERAPIST

## 2025-01-31 PROCEDURE — 72100 X-RAY EXAM L-S SPINE 2/3 VWS: CPT | Performed by: INTERNAL MEDICINE

## 2025-01-31 RX ORDER — ASPIRIN 81 MG/1
81 TABLET ORAL DAILY
Start: 2025-01-31

## 2025-01-31 RX ORDER — OXYCODONE HYDROCHLORIDE 5 MG/1
5 TABLET ORAL EVERY 6 HOURS PRN
Qty: 28 TABLET | Refills: 0 | Status: SHIPPED | OUTPATIENT
Start: 2025-01-31 | End: 2025-02-07 | Stop reason: SDUPTHER

## 2025-01-31 RX ORDER — CYCLOBENZAPRINE HCL 5 MG
5 TABLET ORAL 3 TIMES DAILY PRN
Qty: 60 TABLET | Refills: 0 | Status: SHIPPED | OUTPATIENT
Start: 2025-01-31 | End: 2025-03-02

## 2025-01-31 RX ORDER — TIRZEPATIDE 7.5 MG/.5ML
7.5 INJECTION, SOLUTION SUBCUTANEOUS WEEKLY
Start: 2025-01-31

## 2025-01-31 RX ADMIN — ACETAMINOPHEN 650 MG: 325 TABLET ORAL at 10:09

## 2025-01-31 RX ADMIN — PANTOPRAZOLE SODIUM 40 MG: 40 TABLET, DELAYED RELEASE ORAL at 06:15

## 2025-01-31 RX ADMIN — OXYCODONE HYDROCHLORIDE 10 MG: 10 TABLET ORAL at 02:52

## 2025-01-31 RX ADMIN — KETOROLAC TROMETHAMINE 30 MG: 30 INJECTION, SOLUTION INTRAMUSCULAR at 06:15

## 2025-01-31 RX ADMIN — OXYCODONE HYDROCHLORIDE 10 MG: 10 TABLET ORAL at 08:02

## 2025-01-31 RX ADMIN — TAMSULOSIN HYDROCHLORIDE 0.4 MG: 0.4 CAPSULE ORAL at 08:02

## 2025-01-31 RX ADMIN — POLYETHYLENE GLYCOL 3350 17 G: 17 POWDER, FOR SOLUTION ORAL at 08:01

## 2025-01-31 RX ADMIN — PRAVASTATIN SODIUM 80 MG: 20 TABLET ORAL at 08:02

## 2025-01-31 RX ADMIN — KETOROLAC TROMETHAMINE 30 MG: 30 INJECTION, SOLUTION INTRAMUSCULAR at 12:09

## 2025-01-31 RX ADMIN — ACETAMINOPHEN 650 MG: 325 TABLET ORAL at 02:52

## 2025-01-31 RX ADMIN — OXYCODONE HYDROCHLORIDE 10 MG: 10 TABLET ORAL at 12:08

## 2025-01-31 RX ADMIN — HEPARIN SODIUM 5000 UNITS: 5000 INJECTION INTRAVENOUS; SUBCUTANEOUS at 08:02

## 2025-01-31 RX ADMIN — LIDOCAINE 4% 1 PATCH: 40 PATCH TOPICAL at 08:01

## 2025-01-31 RX ADMIN — CYCLOBENZAPRINE HYDROCHLORIDE 5 MG: 5 TABLET, FILM COATED ORAL at 08:02

## 2025-01-31 ASSESSMENT — COGNITIVE AND FUNCTIONAL STATUS - GENERAL
DRESSING REGULAR LOWER BODY CLOTHING: A LITTLE
MOBILITY SCORE: 23
DAILY ACTIVITIY SCORE: 23
DAILY ACTIVITIY SCORE: 22
DRESSING REGULAR LOWER BODY CLOTHING: A LITTLE
HELP NEEDED FOR BATHING: A LITTLE
CLIMB 3 TO 5 STEPS WITH RAILING: A LITTLE
MOBILITY SCORE: 24

## 2025-01-31 ASSESSMENT — PAIN SCALES - GENERAL
PAINLEVEL_OUTOF10: 7
PAINLEVEL_OUTOF10: 5 - MODERATE PAIN
PAINLEVEL_OUTOF10: 5 - MODERATE PAIN
PAINLEVEL_OUTOF10: 4
PAINLEVEL_OUTOF10: 7

## 2025-01-31 ASSESSMENT — PAIN DESCRIPTION - ORIENTATION
ORIENTATION: LOWER
ORIENTATION: LOWER

## 2025-01-31 ASSESSMENT — PAIN - FUNCTIONAL ASSESSMENT
PAIN_FUNCTIONAL_ASSESSMENT: 0-10

## 2025-01-31 ASSESSMENT — PAIN DESCRIPTION - LOCATION
LOCATION: BACK
LOCATION: BACK

## 2025-01-31 ASSESSMENT — ACTIVITIES OF DAILY LIVING (ADL): ADL_ASSISTANCE: INDEPENDENT

## 2025-01-31 NOTE — NURSING NOTE
Discharge instructions given and reviewed with patient. Patient verbalizes understanding. IV removed. Patient discharging home via private vehicle.

## 2025-01-31 NOTE — PROGRESS NOTES
01/31/25 1056   Discharge Planning   Living Arrangements Spouse/significant other   Support Systems Spouse/significant other   Type of Residence Private residence   Home or Post Acute Services None   Expected Discharge Disposition Home   Does the patient need discharge transport arranged? Yes     Met with patient at bedside. Admitted for lumbar fusion. Pt lives with spouse and was independent PTA with no HHC. Pt has a walker. PCP is Jose Antonio Capellan. Pt feels he is able to manage his health and understands his medications. Was able to drive and obtain meds. Therapy evals pending. Pt plans to return home with no new discharge needs. Family will provide transport home.

## 2025-01-31 NOTE — PROGRESS NOTES
Physical Therapy    Physical Therapy Evaluation    Patient Name: Kevin Olea  MRN: [id number]  Today's Date: 1/31/2025   Time Calculation  Start Time: 1052  Stop Time: 1107  Time Calculation (min): 15 min  4116/4116-A    Assessment/Plan   PT Assessment  Evaluation/Treatment Tolerance: Patient tolerated treatment well  Medical Staff Made Aware: Yes  End of Session Communication: Bedside nurse  Assessment Comment: Pt is independent with all ambulation and mobility. Pt is safe to discharge home with available support and assist. No further inpatient PT needs; will sign off. Pt may benefit from outpatient PT once cleared by surgeon.   End of Session Patient Position:  (seated at EOB)  IP OR SWING BED PT PLAN  Inpatient or Swing Bed: Inpatient  PT Plan  PT Plan: PT Eval only  PT Eval Only Reason: Safe to return home  PT Frequency: PT eval only  PT Discharge Recommendations: No further acute PT (OP PT once cleared by surgeon)  PT Recommended Transfer Status: Independent  PT - OK to Discharge: Yes (To next level of care when cleared by medical team   )    Subjective       General Visit Information:  General  Reason for Referral: recent spine surgery  Referred By: Dr. Arita  Past Medical History Relevant to Rehab: Pt admitted 1/30/25 following completion of L4-L5, L5-S1 Anterior Lumbar Interbody Fusion with screws. PMH: anxiety, cervical sponylosis without myelopathy, left foot drop, spinal stenosis  Family/Caregiver Present: No  Co-Treatment: OT  Co-Treatment Reason: for discharge planning  Prior to Session Communication: Bedside nurse  Patient Position Received: Bed, 2 rail up  Preferred Learning Style: verbal  General Comment: Pt agreeable to PT, nursing cleared for treatment.    Home Living:  Home Living  Type of Home: House  Lives With: Spouse (daughter)  Home Adaptive Equipment: Walker rolling or standard, Cane  Home Layout: Two level, Stairs to alternate level with rails  Alternate Level Stairs-Number of Steps:  13  Home Access: Stairs to enter without rails  Entrance Stairs-Number of Steps: one  Bathroom Shower/Tub: Tub/shower unit    Prior Level of Function:  Prior Function Per Pt/Caregiver Report  ADL Assistance: Independent  Homemaking Assistance: Independent  Ambulatory Assistance: Independent    Precautions:  Precautions  Medical Precautions: Fall precautions  Post-Surgical Precautions: Spinal precautions       Objective     Pain:  Pain Assessment  Pain Assessment: 0-10  0-10 (Numeric) Pain Score: 5 - Moderate pain  Pain Type: Surgical pain  Pain Location: Back (and abdomen)  Pain Orientation: Lower  Pain Interventions: Ambulation/increased activity, Repositioned    Cognition:  Cognition  Overall Cognitive Status: Within Functional Limits    General Assessments:      Activity Tolerance  Endurance: Endurance does not limit participation in activity  Sensation  Sensation Comment: denies numbness and tingling              Static Sitting Balance  Static Sitting-Comment/Number of Minutes: good  Dynamic Sitting Balance  Dynamic Sitting-Comments: good  Static Standing Balance  Static Standing-Comment/Number of Minutes: fair  Dynamic Standing Balance  Dynamic Standing-Comments: fair    Functional Assessments:     Bed Mobility  Bed Mobility: Yes  Bed Mobility 1  Bed Mobility 1: Supine to sitting  Level of Assistance 1: Modified independent  Bed Mobility Comments 1: using log roll technique  Transfers  Transfer: Yes  Transfer 1  Transfer From 1: Sit to  Transfer to 1: Stand  Transfer Level of Assistance 1: Independent  Transfers 2  Transfer From 2: Stand to  Transfer to 2: Sit  Transfer Level of Assistance 2: Independent  Ambulation/Gait Training  Ambulation/Gait Training Performed: Yes  Ambulation/Gait Training 1  Assistance 1: Independent  Comments/Distance (ft) 1: 300  Stairs  Stairs: Yes  Stairs  Rails 1: Left  Device 1: Railing  Assistance 1: Independent  Comment/Number of Steps 1: 3 stairs using step to step pattern        Extremity/Trunk Assessments:        RLE   RLE : Within Functional Limits (with functional movement assessment)  LLE   LLE : Within Functional Limits (with functional movement assessment)    Outcome Measures:     [facility] Basic Mobility  Turning from your back to your side while in a flat bed without using bedrails: None  Moving from lying on your back to sitting on the side of a flat bed without using bedrails: None  Moving to and from bed to chair (including a wheelchair): None  Standing up from a chair using your arms (e.g. wheelchair or bedside chair): None  To walk in hospital room: None  Climbing 3-5 steps with railing: None  Basic Mobility - Total Score: 24                  Education Documentation  Precautions, taught by Jie Ni, PT at 1/31/2025  2:56 PM.  Learner: Patient  Readiness: Acceptance  Method: Explanation  Response: Verbalizes Understanding, Demonstrated Understanding    Body Mechanics, taught by Jie Ni, PT at 1/31/2025  2:56 PM.  Learner: Patient  Readiness: Acceptance  Method: Explanation  Response: Verbalizes Understanding, Demonstrated Understanding    Mobility Training, taught by Jie Ni, PT at 1/31/2025  2:56 PM.  Learner: Patient  Readiness: Acceptance  Method: Explanation  Response: Verbalizes Understanding, Demonstrated Understanding    Education Comments  No comments found.

## 2025-01-31 NOTE — DISCHARGE SUMMARY
Discharge Diagnosis  S/P L4-S1 ALIF    Issues Requiring Follow-Up  None, F/U in place    Test Results Pending At Discharge  Pending Labs       No current pending labs.            Hospital Course  Presented for elective lumbar ALIF fusion. Recovered well in PACU. Admitted to floor post-operatively. Pain controlled. No complications post-operatively. Meets criteria for discharge.    Pertinent Physical Exam At Time of Discharge  General: Well developed, awake/alert/oriented x3, no distress, alert and cooperative  Skin: Warm and dry  ENMT: Mucous membranes moist  Head/Neck: Neck Supple  Respiratory/Thorax: Normal breath sounds with good chest expansion  Cardiovascular: No pitting edema    Motor Strength: 5/5 Throughout all extremities    Sensation: intact to light touch    Incision well approximated. No erythema/edema or drainage    Home Medications     Medication List      START taking these medications     cyclobenzaprine 5 mg tablet; Commonly known as: Flexeril; Take 1 tablet   (5 mg) by mouth 3 times a day as needed for muscle spasms.   oxyCODONE 5 mg immediate release tablet; Commonly known as: Roxicodone;   Take 1 tablet (5 mg) by mouth every 6 hours if needed for moderate pain (4   - 6) or severe pain (7 - 10) for up to 7 days.     CHANGE how you take these medications     aspirin 81 mg EC tablet; Take 1 tablet (81 mg) by mouth once daily. You   may restart after 2/7/25; What changed: additional instructions   Mounjaro 7.5 mg/0.5 mL pen injector; Generic drug: tirzepatide; Inject   7.5 mg under the skin 1 (one) time per week. You may restart after 2/7/25;   What changed: additional instructions     CONTINUE taking these medications     latanoprost 0.005 % ophthalmic solution; Commonly known as: Xalatan   losartan 100 mg tablet; Commonly known as: Cozaar; TAKE ONE TABLET BY   MOUTH EVERY DAY   omeprazole 40 mg DR capsule; Commonly known as: PriLOSEC; TAKE ONE   CAPSULE BY MOUTH DAILY (NEED APPOINTMENT - LAST  FILL)   pravastatin 80 mg tablet; Commonly known as: Pravachol; Take 1 tablet   (80 mg) by mouth once daily.   tadalafil 5 mg tablet; Commonly known as: Cialis; Take 1 tablet (5 mg)   by mouth once daily.   tamsulosin 0.4 mg 24 hr capsule; Commonly known as: Flomax; Take 1   capsule (0.4 mg) by mouth once daily.     STOP taking these medications     Advil -38 mg tablet per tablet; Generic drug:   ibuprofen-diphenhydramine cit   chlorhexidine 0.12 % solution; Commonly known as: Peridex       Outpatient Follow-Up  Future Appointments   Date Time Provider Department [place]   2/19/2025 11:00 AM Yomi Lyon PA-C [id number] [place]   4/28/2025 11:00 AM Devon Arita MD [id number] None             Sarah Vargas [facility] [address] [address] [place] [address]  [address] [place], [place] [address]     Phone: [phone]  Fax: [phone]

## 2025-01-31 NOTE — PROGRESS NOTES
Occupational Therapy    Evaluation    Patient Name: Kevin Olea  MRN: [id number]  Today's Date: 1/31/2025  Time Calculation  Start Time: 1055  Stop Time: 1107  Time Calculation (min): 12 min  4116/4116-A  Eval only     Assessment  IP OT Assessment  Prognosis: Good  Medical Staff Made Aware: Yes  End of Session Communication: Bedside nurse  End of Session Patient Position:  (remained seated at edge of bed with call light and tray table in reach.)  Patient is independent without assistive device functional mobility tasks and SBA for lower body dressing.  Patient denies OT needs.  No acute OT goals identified. Will discharge OT order.     Plan:  No Skilled OT: No acute OT goals identified  OT Frequency: OT eval only  OT Discharge Recommendations: No further acute OT  Equipment Recommended upon Discharge:  (reacher)  OT - OK to Discharge: Yes from acute care OT services to the next level of care when cleared by medical team      Subjective   Current Problem:  1. Lumbar radiculopathy  Insert and maintain peripheral IV    Saline lock IV    Type And Screen    Pulse oximetry, spot    povidone-iodine 5 % kit kit    celecoxib (CeleBREX) capsule 400 mg    acetaminophen (Tylenol) tablet 975 mg    tranexamic acid (Lysteda) tablet 1,300 mg    Admit to inpatient    Insert and maintain peripheral IV    Saline lock IV    Pulse oximetry, spot    Admit to inpatient    FL fluoro images no charge    FL fluoro images no charge    CANCELED: Full code    CANCELED: NPO Diet Except: Sips with meds; Effective now    CANCELED: Height and weight    CANCELED: POCT Glucose    CANCELED: Vital Signs    CANCELED: Apply sequential compression device    CANCELED: Apply PALOMA hose    CANCELED: Full code    CANCELED: NPO Diet Except: Sips with meds; Effective now    CANCELED: Height and weight    CANCELED: POCT Glucose    CANCELED: Vital Signs    CANCELED: Apply sequential compression device    CANCELED: Apply PALOMA hose    CANCELED: FL less than 1 hour     CANCELED: FL less than 1 hour      2. Lumbar radiculopathy, chronic        3. Back pain, unspecified back location, unspecified back pain laterality, unspecified chronicity  Insert and maintain peripheral IV    Saline lock IV    Type And Screen    Pulse oximetry, spot    povidone-iodine 5 % kit kit    celecoxib (CeleBREX) capsule 400 mg    acetaminophen (Tylenol) tablet 975 mg    tranexamic acid (Lysteda) tablet 1,300 mg    Admit to inpatient    Insert and maintain peripheral IV    Saline lock IV    Pulse oximetry, spot    Admit to inpatient    FL fluoro images no charge    FL fluoro images no charge    CANCELED: Full code    CANCELED: NPO Diet Except: Sips with meds; Effective now    CANCELED: Height and weight    CANCELED: POCT Glucose    CANCELED: Vital Signs    CANCELED: Apply sequential compression device    CANCELED: Apply PALOMA hose    CANCELED: Full code    CANCELED: NPO Diet Except: Sips with meds; Effective now    CANCELED: Height and weight    CANCELED: POCT Glucose    CANCELED: Vital Signs    CANCELED: Apply sequential compression device    CANCELED: Apply PALOMA hose    CANCELED: FL less than 1 hour    CANCELED: FL less than 1 hour      4. Type 2 diabetes mellitus without complication, unspecified whether long term insulin use (Multi)  tirzepatide (Mounjaro) 7.5 mg/0.5 mL pen injector      5. History of heart disease  aspirin 81 mg EC tablet      6. Post-operative pain  cyclobenzaprine (Flexeril) 5 mg tablet    oxyCODONE (Roxicodone) 5 mg immediate release tablet          General:  General  Reason for Referral: recent spine surgery  Referred By: Dr Arita  Past Medical History Relevant to Rehab: Admitted 1/30/2025 after having the following completed by Dr Arita and Dr Plascencia:L4-L5, L5-S1 Anterior Lumbar Interbody Fusion with screws   PMH: BPH, arthritis, HTN, HLD, glaucoma, pre-diabetes, back surgery, wrist surgery  Co-Treatment: PT  Co-Treatment Reason: for discharge planning  Patient Position Received:  Bed, 2 rail up  Preferred Learning Style: verbal  General Comment: Patient in long legged sitting in bed and agreeable to participate in OT evaluation.    Precautions:  Medical Precautions: Fall precautions  Post-Surgical Precautions: Spinal precautions    Pain:  Pain Assessment  Pain Assessment: 0-10  0-10 (Numeric) Pain Score: 5 - Moderate pain  Pain Type: Surgical pain  Pain Location:  (back and abdomen)  Pain Interventions: Rest    Objective   Cognition:  Overall Cognitive Status: Within Functional Limits    Home Living:  Home Living Comments: Lives in 2 story home with 1 BOST[place]. Has 13 steps with railing to 2nd floor where bedroom and tub shower are located.  Spouse and daughter will be home but spouse is unable to assist.     Prior Function:  Prior Function Comments: Was independent with all ADLs, IADLs, and functional mobility tasks without AD. 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    ADL:  UE Dressing Assistance: Independent (don pull over shirt)  LE Dressing Assistance: Stand by (don shorts)  ADL Comments: Educated patient on safety and comp strategies for LB dressing    Activity Tolerance:  Endurance: Endurance does not limit participation in activity    Bed Mobility/Transfers:   Bed Mobility  Bed Mobility:  (mod I with log roll supine to sit)  Transfers  Transfer:  (independent sit <>stand)    Ambulation/Gait Training:  Functional Mobility  Functional Mobility Performed:  (Independent without AD and no loss of balance)    Extremities: RUE   RUE : Within Functional Limits and LUE   LUE: Within Functional Limits    Outcome Measures: [facility] Daily Activity  Putting on and taking off regular lower body clothing: A little  Bathing (including washing, rinsing, drying): A little  Putting on and taking off regular upper body clothing: None  Toileting, which includes using toilet, bedpan or urinal: None  Taking care of personal grooming such as brushing teeth: None  Eating Meals: None  Daily Activity - Total  Score: 22    EDUCATION:  Education  Individual(s) Educated: Patient  Education Provided: Fall precautons (spinal precautions, safety and comp strategies with LB dressing)  Patient Response to Education: Patient/Caregiver Verbalized Understanding of Information    Goals:   n/a

## 2025-01-31 NOTE — CARE PLAN
The patient's goals for the shift include      The clinical goals for the shift include Pt admission will be completed by end of shift 1/30/25

## 2025-02-07 DIAGNOSIS — G89.18 POST-OPERATIVE PAIN: ICD-10-CM

## 2025-02-07 RX ORDER — OXYCODONE HYDROCHLORIDE 5 MG/1
5 TABLET ORAL EVERY 6 HOURS PRN
Qty: 28 TABLET | Refills: 0 | Status: SHIPPED | OUTPATIENT
Start: 2025-02-07 | End: 2025-02-14

## 2025-02-07 RX ORDER — OXYCODONE HYDROCHLORIDE 5 MG/1
5 TABLET ORAL EVERY 6 HOURS PRN
Qty: 28 TABLET | Refills: 0 | Status: CANCELLED | OUTPATIENT
Start: 2025-02-07 | End: 2025-02-14

## 2025-02-07 NOTE — PROGRESS NOTES
Refill provided for Oxycodone    I have personally reviewed the OARRS report. This report is scanned into the electronic medical record. I have considered the risks of abuse, dependence, addiction and diversion.     Patient with recent history of major reconstructive surgery necessitating narcotic medications at higher doses during the post-operative period of 6 weeks.     Will continue with close monitoring and short course refills.

## 2025-02-10 DIAGNOSIS — J45.20 MILD INTERMITTENT ASTHMA WITHOUT COMPLICATION (HHS-HCC): Primary | ICD-10-CM

## 2025-02-19 ENCOUNTER — APPOINTMENT (OUTPATIENT)
Dept: NEUROSURGERY | Facility: CLINIC | Age: 65
End: 2025-02-19
Payer: COMMERCIAL

## 2025-02-19 VITALS
HEART RATE: 78 BPM | DIASTOLIC BLOOD PRESSURE: 82 MMHG | SYSTOLIC BLOOD PRESSURE: 144 MMHG | BODY MASS INDEX: 34.69 KG/M2 | HEIGHT: 70 IN | WEIGHT: 242.3 LBS | TEMPERATURE: 97.6 F

## 2025-02-19 DIAGNOSIS — Z98.1 STATUS POST LUMBAR SPINAL FUSION: Primary | ICD-10-CM

## 2025-02-19 DIAGNOSIS — G89.18 POST-OPERATIVE PAIN: ICD-10-CM

## 2025-02-19 PROCEDURE — 1036F TOBACCO NON-USER: CPT | Performed by: PHYSICIAN ASSISTANT

## 2025-02-19 PROCEDURE — 3008F BODY MASS INDEX DOCD: CPT | Performed by: PHYSICIAN ASSISTANT

## 2025-02-19 PROCEDURE — 3079F DIAST BP 80-89 MM HG: CPT | Performed by: PHYSICIAN ASSISTANT

## 2025-02-19 PROCEDURE — 99024 POSTOP FOLLOW-UP VISIT: CPT | Performed by: PHYSICIAN ASSISTANT

## 2025-02-19 PROCEDURE — 3077F SYST BP >= 140 MM HG: CPT | Performed by: PHYSICIAN ASSISTANT

## 2025-02-19 RX ORDER — OXYCODONE HYDROCHLORIDE 5 MG/1
5 TABLET ORAL EVERY 6 HOURS PRN
Qty: 28 TABLET | Refills: 0 | Status: SHIPPED | OUTPATIENT
Start: 2025-02-19 | End: 2025-02-26

## 2025-02-19 RX ORDER — CYCLOBENZAPRINE HCL 10 MG
10 TABLET ORAL EVERY 8 HOURS PRN
Qty: 90 TABLET | Refills: 0 | Status: SHIPPED | OUTPATIENT
Start: 2025-02-19 | End: 2025-03-21

## 2025-02-19 ASSESSMENT — PATIENT HEALTH QUESTIONNAIRE - PHQ9
SUM OF ALL RESPONSES TO PHQ9 QUESTIONS 1 & 2: 0
1. LITTLE INTEREST OR PLEASURE IN DOING THINGS: NOT AT ALL
2. FEELING DOWN, DEPRESSED OR HOPELESS: NOT AT ALL

## 2025-02-19 ASSESSMENT — PAIN SCALES - GENERAL: PAINLEVEL_OUTOF10: 5

## 2025-02-19 NOTE — PROGRESS NOTES
[facility] [place]  Department of Neurological Surgery  Post Operative Patient Visit      History of Present Illness:  Kevin Olea is a 64 y.o. year old male who presents post L4-S1 ALIF along with posterior screws and rods by Dr. Devon Arita and Dr. Plascencia for exposure on January 30, 2025.  Patient has improvement in lower extremity radiculopathy and feels as if he is gaining more control left lower extremity.  Continues with posterior lumbar pain utilizing prescription pain medication and refill as requested today.  Also benefit from increased dose of muscle relaxer and 10 mg Flexeril sent to pharmacy. Inspection of anterior incision shows well approximated, nonerythemic, and nonedematous with good fibrotic tissue spanning. Staples removed today with patient tolerating well and no complications encountered.  Posterior incisions also well-appearing.  Referral placed to outpatient physical therapy and order for xrays to be taken just prior to next followup.           14/14 systems reviewed and negative other than what is listed in the history of present illness    Patient Active Problem List   Diagnosis    Acute sinusitis    Anxiety    Arthritis of knee, right    Luna's esophagus    Cervical spondylosis without myelopathy    Backache    Chest pain    Hip pain    Leg pain    Lower back pain    Gastroesophageal reflux disease    History of heart disease    Hyperlipidemia    Hypothyroidism    Hypertension    Infected finger    Knee pain, bilateral    Left foot drop    Left knee pain    Low-density-lipoid-type (LDL) hyperlipoproteinemia    Muscle spasm    Nausea in adult    Neck pain    Pre-diabetes    Rash    Right knee pain    Sleep apnea    Spinal stenosis, lumbar region, with neurogenic claudication    Vitamin D deficiency    Biceps rupture, proximal, left, initial encounter    Lumbar radiculopathy    Bipolar affective disorder, remission status unspecified (Multi)    Mild intermittent  asthma without complication ([facility])    Lumbar radiculopathy, chronic     Past Medical History:   Diagnosis Date    Arthritis     BPH (benign prostatic hyperplasia)     Cataract     Cervical disc disorder 2010    GERD (gastroesophageal reflux disease)     Glaucoma     Hyperlipidemia     Hypertension     Joint pain     Low back pain 2010    Personal history of other specified conditions 05/17/2013    History of fatigue    Pre-diabetes     Pulmonary arterial hypertension (Multi) 2000    Skin cancer     Sleep apnea     Thoracic disc disorder 1977     Past Surgical History:   Procedure Laterality Date    BACK SURGERY      BICEPS TENDON REPAIR Right 2013    CARPAL TUNNEL RELEASE  2023    RT    EYE SURGERY  07/01/2013    Eye Surgery    KNEE ARTHROPLASTY Right 2022    LUMBAR LAMINECTOMY  2018    Left side Lumbar    OTHER SURGICAL HISTORY  07/01/2013    Wrist Surgery     Social History     Tobacco Use    Smoking status: Never     Passive exposure: Never    Smokeless tobacco: Never   Substance Use Topics    Alcohol use: Yes     Alcohol/week: 2.0 standard drinks of alcohol     Types: 2 Standard drinks or equivalent per week     family history includes Cancer in his mother; Heart attack in his father; Hypertension in his father.    Current Outpatient Medications:     aspirin 81 mg EC tablet, Take 1 tablet (81 mg) by mouth once daily. You may restart after 2/7/25, Disp: , Rfl:     cyclobenzaprine (Flexeril) 10 mg tablet, Take 1 tablet (10 mg) by mouth every 8 hours if needed for muscle spasms., Disp: 90 tablet, Rfl: 0    cyclobenzaprine (Flexeril) 5 mg tablet, Take 1 tablet (5 mg) by mouth 3 times a day as needed for muscle spasms., Disp: 60 tablet, Rfl: 0    latanoprost (Xalatan) 0.005 % ophthalmic solution, Administer 1 drop into the left eye once daily at bedtime., Disp: , Rfl:     losartan (Cozaar) 100 mg tablet, TAKE ONE TABLET BY MOUTH EVERY DAY, Disp: 30 tablet, Rfl: 0    omeprazole (PriLOSEC) 40 mg DR capsule, TAKE ONE  CAPSULE BY MOUTH DAILY (NEED APPOINTMENT - LAST FILL), Disp: 90 capsule, Rfl: 1    oxyCODONE (Roxicodone) 5 mg immediate release tablet, Take 1 tablet (5 mg) by mouth every 6 hours if needed for moderate pain (4 - 6) or severe pain (7 - 10) for up to 7 days., Disp: 28 tablet, Rfl: 0    pravastatin (Pravachol) 80 mg tablet, Take 1 tablet (80 mg) by mouth once daily., Disp: 90 tablet, Rfl: 1    tadalafil (Cialis) 5 mg tablet, Take 1 tablet (5 mg) by mouth once daily., Disp: 90 tablet, Rfl: 1    tamsulosin (Flomax) 0.4 mg 24 hr capsule, Take 1 capsule (0.4 mg) by mouth once daily., Disp: 90 capsule, Rfl: 1    tirzepatide (Mounjaro) 7.5 mg/0.5 mL pen injector, Inject 7.5 mg under the skin 1 (one) time per week. You may restart after 2/7/25, Disp: , Rfl:   No Known Allergies      The above clinical summary has been dictated with voice recognition software. It has not been proofread for grammatical errors, typographical mistakes, or other semantic inconsistencies.    Thank you for visiting our office today. It was our pleasure to take part in your healthcare.     Do not hesitate to call with any questions regarding your plan of care after leaving at [phone] M-F 8am-4pm.     To clinicians, thank you very much for this kind referral. It is a privilege to partner with you in the care of your patients. My office would be delighted to assist you with any further consultations or with questions regarding the plan of care outlined. Do not hesitate to call the office or contact me directly.       Sincerely,      RADHA Galvez, PA-C  Associate Physician Assistant, Neurosurgery  Clinical   [facility] [employer] [place] [address]    Phone: [phone]  Fax: [phone]

## 2025-03-03 DIAGNOSIS — E11.9 TYPE 2 DIABETES MELLITUS WITHOUT COMPLICATION, UNSPECIFIED WHETHER LONG TERM INSULIN USE (MULTI): ICD-10-CM

## 2025-03-03 DIAGNOSIS — K21.9 GASTROESOPHAGEAL REFLUX DISEASE, UNSPECIFIED WHETHER ESOPHAGITIS PRESENT: ICD-10-CM

## 2025-03-03 RX ORDER — OMEPRAZOLE 40 MG/1
CAPSULE, DELAYED RELEASE ORAL
Qty: 90 CAPSULE | Refills: 1 | Status: SHIPPED | OUTPATIENT
Start: 2025-03-03

## 2025-03-03 RX ORDER — TIRZEPATIDE 7.5 MG/.5ML
7.5 INJECTION, SOLUTION SUBCUTANEOUS WEEKLY
Qty: 2 ML | Refills: 0 | Status: SHIPPED | OUTPATIENT
Start: 2025-03-03

## 2025-03-24 LAB
ATRIAL RATE: 69 BPM
P AXIS: 12 DEGREES
P OFFSET: 203 MS
P ONSET: 147 MS
PR INTERVAL: 146 MS
Q ONSET: 220 MS
QRS COUNT: 12 BEATS
QRS DURATION: 96 MS
QT INTERVAL: 410 MS
QTC CALCULATION(BAZETT): 439 MS
QTC FREDERICIA: 429 MS
R AXIS: -3 DEGREES
T AXIS: 26 DEGREES
T OFFSET: 425 MS
VENTRICULAR RATE: 69 BPM

## 2025-04-25 ENCOUNTER — HOSPITAL ENCOUNTER (OUTPATIENT)
Dept: RADIOLOGY | Facility: HOSPITAL | Age: 65
Discharge: HOME | End: 2025-04-25
Payer: COMMERCIAL

## 2025-04-25 DIAGNOSIS — Z98.1 STATUS POST LUMBAR SPINAL FUSION: ICD-10-CM

## 2025-04-25 PROCEDURE — 72100 X-RAY EXAM L-S SPINE 2/3 VWS: CPT

## 2025-04-28 ENCOUNTER — OFFICE VISIT (OUTPATIENT)
Facility: CLINIC | Age: 65
End: 2025-04-28
Payer: COMMERCIAL

## 2025-04-28 VITALS
WEIGHT: 231.3 LBS | HEART RATE: 93 BPM | BODY MASS INDEX: 33.11 KG/M2 | RESPIRATION RATE: 16 BRPM | TEMPERATURE: 97.6 F | DIASTOLIC BLOOD PRESSURE: 82 MMHG | SYSTOLIC BLOOD PRESSURE: 140 MMHG | HEIGHT: 70 IN

## 2025-04-28 DIAGNOSIS — M54.16 LUMBAR RADICULOPATHY: Primary | ICD-10-CM

## 2025-04-28 PROCEDURE — 3008F BODY MASS INDEX DOCD: CPT | Performed by: STUDENT IN AN ORGANIZED HEALTH CARE EDUCATION/TRAINING PROGRAM

## 2025-04-28 PROCEDURE — 99211 OFF/OP EST MAY X REQ PHY/QHP: CPT | Performed by: STUDENT IN AN ORGANIZED HEALTH CARE EDUCATION/TRAINING PROGRAM

## 2025-04-28 PROCEDURE — 3077F SYST BP >= 140 MM HG: CPT | Performed by: STUDENT IN AN ORGANIZED HEALTH CARE EDUCATION/TRAINING PROGRAM

## 2025-04-28 PROCEDURE — 3079F DIAST BP 80-89 MM HG: CPT | Performed by: STUDENT IN AN ORGANIZED HEALTH CARE EDUCATION/TRAINING PROGRAM

## 2025-04-28 PROCEDURE — 1036F TOBACCO NON-USER: CPT | Performed by: STUDENT IN AN ORGANIZED HEALTH CARE EDUCATION/TRAINING PROGRAM

## 2025-04-28 ASSESSMENT — PATIENT HEALTH QUESTIONNAIRE - PHQ9
2. FEELING DOWN, DEPRESSED OR HOPELESS: NOT AT ALL
SUM OF ALL RESPONSES TO PHQ9 QUESTIONS 1 AND 2: 0
1. LITTLE INTEREST OR PLEASURE IN DOING THINGS: NOT AT ALL

## 2025-04-28 ASSESSMENT — PAIN SCALES - GENERAL: PAINLEVEL_OUTOF10: 2

## 2025-04-28 NOTE — PROGRESS NOTES
[facility] [place]  Department of Neurological Surgery  Post Operative Patient Visit      History of Present Illness:  Kevin Olea is a 64 y.o. year old male who presents to the spine clinic in a post operative visit. Since surgery they are 2-3 months s/p L4-S1 ALIF along with posterior screws and rods on 1/30/2025. Today he reports that he is feeling really good. Majority of his pain has resolved. Pain is only brought on with over-exertion. X-rays show hardware in good position. He is healing well and we will give him some more time to heal. He will follow up as needed moving forward and call me if his symptoms worsen or progress.       The above clinical summary has been dictated with voice recognition software. It has not been proofread for grammatical errors, typographical mistakes, or other semantic inconsistencies.    Thank you for visiting our office today. It was our pleasure to take part in your healthcare.     Do not hesitate to call with any questions regarding your plan of care after leaving at [phone] M-F 8am-4pm.     To clinicians, thank you very much for this kind referral. It is a privilege to partner with you in the care of your patients. My office would be delighted to assist you with any further consultations or with questions regarding the plan of care outlined. Do not hesitate to call the office or contact me directly.       Sincerely,      Devon Arita MD, [facility]  Spine , [facility]  Darrell Meek Chair in Spinal Neurosurgery  Complex Spine Surgery Fellowship Director   of Neurological Surgery  [facility] [employer]  Phone: [phone]  Fax: [phone]        Scribe Attestation  By signing my name below, IMelani Scribe   attest that this documentation has been prepared under the direction and in the presence of Devon Arita MD.

## 2025-05-16 DIAGNOSIS — E11.9 TYPE 2 DIABETES MELLITUS WITHOUT COMPLICATION, UNSPECIFIED WHETHER LONG TERM INSULIN USE: ICD-10-CM

## 2025-05-16 RX ORDER — TIRZEPATIDE 7.5 MG/.5ML
7.5 INJECTION, SOLUTION SUBCUTANEOUS WEEKLY
Qty: 2 ML | Refills: 0 | Status: SHIPPED | OUTPATIENT
Start: 2025-05-16

## 2025-06-05 DIAGNOSIS — E11.9 TYPE 2 DIABETES MELLITUS WITHOUT COMPLICATION, UNSPECIFIED WHETHER LONG TERM INSULIN USE: ICD-10-CM

## 2025-06-05 RX ORDER — TIRZEPATIDE 7.5 MG/.5ML
7.5 INJECTION, SOLUTION SUBCUTANEOUS WEEKLY
Qty: 2 ML | Refills: 2 | Status: CANCELLED | OUTPATIENT
Start: 2025-06-05

## 2025-06-24 DIAGNOSIS — R97.20 ELEVATED PSA: ICD-10-CM

## 2025-06-24 DIAGNOSIS — E11.9 TYPE 2 DIABETES MELLITUS WITHOUT COMPLICATION, UNSPECIFIED WHETHER LONG TERM INSULIN USE: ICD-10-CM

## 2025-06-24 RX ORDER — TIRZEPATIDE 7.5 MG/.5ML
7.5 INJECTION, SOLUTION SUBCUTANEOUS WEEKLY
Qty: 2 ML | Refills: 0 | Status: SHIPPED | OUTPATIENT
Start: 2025-06-24

## 2025-06-24 RX ORDER — TAMSULOSIN HYDROCHLORIDE 0.4 MG/1
0.4 CAPSULE ORAL DAILY
Qty: 90 CAPSULE | Refills: 0 | Status: SHIPPED | OUTPATIENT
Start: 2025-06-24

## 2025-08-08 DIAGNOSIS — Z12.5 ENCOUNTER FOR PROSTATE CANCER SCREENING: ICD-10-CM

## 2025-08-08 DIAGNOSIS — R97.20 ELEVATED PSA: ICD-10-CM

## 2025-08-08 DIAGNOSIS — I10 PRIMARY HYPERTENSION: ICD-10-CM

## 2025-08-08 DIAGNOSIS — K21.9 GASTROESOPHAGEAL REFLUX DISEASE, UNSPECIFIED WHETHER ESOPHAGITIS PRESENT: ICD-10-CM

## 2025-08-08 DIAGNOSIS — E11.9 TYPE 2 DIABETES MELLITUS WITHOUT COMPLICATION, UNSPECIFIED WHETHER LONG TERM INSULIN USE: ICD-10-CM

## 2025-08-08 RX ORDER — TAMSULOSIN HYDROCHLORIDE 0.4 MG/1
0.4 CAPSULE ORAL DAILY
Qty: 90 CAPSULE | Refills: 0 | Status: SHIPPED | OUTPATIENT
Start: 2025-08-08

## 2025-08-08 RX ORDER — LOSARTAN POTASSIUM 100 MG/1
100 TABLET ORAL DAILY
Qty: 30 TABLET | Refills: 0 | Status: SHIPPED | OUTPATIENT
Start: 2025-08-08

## 2025-08-08 RX ORDER — OMEPRAZOLE 40 MG/1
CAPSULE, DELAYED RELEASE ORAL
Qty: 90 CAPSULE | Refills: 1 | Status: SHIPPED | OUTPATIENT
Start: 2025-08-08

## 2025-08-08 RX ORDER — TIRZEPATIDE 7.5 MG/.5ML
7.5 INJECTION, SOLUTION SUBCUTANEOUS WEEKLY
Qty: 2 ML | Refills: 0 | Status: SHIPPED | OUTPATIENT
Start: 2025-08-08

## 2025-08-08 RX ORDER — TADALAFIL 5 MG/1
5 TABLET ORAL DAILY
Qty: 90 TABLET | Refills: 0 | Status: SHIPPED | OUTPATIENT
Start: 2025-08-08

## (undated) DEVICE — Device

## (undated) DEVICE — SUTURE, VICRYL, 2-0, 36 IN, CT, UNDYED

## (undated) DEVICE — TUBING, SUCTION, 6MM X 10, CLEAN N-COND

## (undated) DEVICE — SEALER, BIPOLAR, AQUA MANTYS 6.0

## (undated) DEVICE — DRAPE, C-ARMOR KIT

## (undated) DEVICE — PROBE, APOLLO RF, 90 DEG, EXTRA LARTGE

## (undated) DEVICE — ELECTRODE, ELECTROSURGICAL, BLADE EXT 4 INCH, INSULATED

## (undated) DEVICE — EXCAL 4MM X 13CM SINGLE

## (undated) DEVICE — NEEDLE, ELECTRODE, ELECTROSURGICAL, INSULATED

## (undated) DEVICE — SUTURE, PDSII, 1, TP-1, VIL, MONO, 48LP

## (undated) DEVICE — CORD, CAUTERY, BIOPOLAR FORCEP, 12FT

## (undated) DEVICE — PATIENT SPECIFIC UNID TECH

## (undated) DEVICE — BUR, MR8 14CM 3MM DIA, MATCH HEAD, LOW PROFILE SP SYM-TRI

## (undated) DEVICE — TOOL, MR8 14CM MATCH 3MM

## (undated) DEVICE — TOWEL PACK, STERILE, 4/PACK, BLUE

## (undated) DEVICE — APPLIER,  LIGACLIP MULTI CLIP, 30 MED 11 1/2

## (undated) DEVICE — SUTURE, PROLENE, 3-0, 18 IN, PS2, BLUE

## (undated) DEVICE — GOWN, SURGICAL, SIRUS, NON REINFORCED, LARGE

## (undated) DEVICE — BLANKET, LOWER BODY, VHA PLUS, ADULT

## (undated) DEVICE — DRESSING, GAUZE, SPONGE, KERLIX, SUPER, 6 X 6.75 IN, STERILE 10PK

## (undated) DEVICE — TUBING, MIDAS MR8 IRRIGATION TUBING, CLEARVIEW

## (undated) DEVICE — SEALANT, HEMOSTATIC, FLOSEAL, 10 ML

## (undated) DEVICE — SUTURE, STRATAFIX, SPIRAL MONOCRYL PLUS, 3-0, PS-2 45CM, UNDYED

## (undated) DEVICE — DRESSING, TRANSPARENT, TEGADERM, FRAME STYLE, 4 X 4.5, STRL

## (undated) DEVICE — BANDAGE, ELASTIC, MATRIX, SELF-CLOSURE, 6 IN X 5 YD, LF

## (undated) DEVICE — SUTURE, PROLENE, 4-0, 36 IN, RB1, DA, BLUE

## (undated) DEVICE — CLOSURE SYSTEM, DERMABOND, PRINEO, 22CM, STERILE

## (undated) DEVICE — TUBING, PUMP MAIN 16FT STERILE

## (undated) DEVICE — ELECTRODE, ELECTROSURGICAL, BLADE, EXTENDED

## (undated) DEVICE — TRAY, SURESTEP, SILICONE DRAINAGE BAG, STATLOCK, 16FR

## (undated) DEVICE — GLOVE, SURGICAL, PROTEXIS PI BLUE W/NEUTHERA, 6.5, PF, LF

## (undated) DEVICE — DRESSING, NON-ADHERENT, 3 X 3 IN, STERILE

## (undated) DEVICE — CAUTERY, PENCIL, PUSH BUTTON, SMOKE EVAC, 70MM

## (undated) DEVICE — GLOVE, PROTEXIS PI CLASSIC, SZ-6.5, PF, LF

## (undated) DEVICE — SPHERE, STEALTHSTATION, 5-PK

## (undated) DEVICE — GLOVE, SURGICAL, PROTEXIS PI MICRO, 8.5, PF, LF

## (undated) DEVICE — SUTURE, VICRYL, 0, 18 IN, CT-1, UNDYED

## (undated) DEVICE — GLOVE, SURGICAL, PROTEXIS PI , 7.5, PF, LF

## (undated) DEVICE — DRESSING, ABDOMINAL, WET PRUF, TENDERSORB, 5 X 9 IN, STERILE